# Patient Record
Sex: MALE | Race: WHITE | Employment: OTHER | URBAN - METROPOLITAN AREA
[De-identification: names, ages, dates, MRNs, and addresses within clinical notes are randomized per-mention and may not be internally consistent; named-entity substitution may affect disease eponyms.]

---

## 2017-04-21 ENCOUNTER — HOSPITAL ENCOUNTER (EMERGENCY)
Age: 82
Discharge: HOME | End: 2017-04-21
Attending: SPECIALIST
Payer: MEDICARE

## 2017-04-21 VITALS
BODY MASS INDEX: 31.01 KG/M2 | TEMPERATURE: 97.6 F | WEIGHT: 175 LBS | RESPIRATION RATE: 14 BRPM | SYSTOLIC BLOOD PRESSURE: 149 MMHG | HEIGHT: 63 IN | HEART RATE: 70 BPM | DIASTOLIC BLOOD PRESSURE: 85 MMHG | OXYGEN SATURATION: 96 %

## 2017-04-21 DIAGNOSIS — R30.0 DYSURIA: Primary | ICD-10-CM

## 2017-04-21 DIAGNOSIS — R42 DIZZINESS: ICD-10-CM

## 2017-04-21 LAB
-: ABNORMAL
AMORPHOUS: ABNORMAL
BACTERIA: ABNORMAL
BILIRUBIN URINE: ABNORMAL
CASTS UA: ABNORMAL /LPF
COLOR: YELLOW
COMMENT UA: ABNORMAL
CRYSTALS, UA: ABNORMAL /HPF
EPITHELIAL CELLS UA: ABNORMAL /HPF (ref 0–5)
GLUCOSE URINE: NEGATIVE
KETONES, URINE: NEGATIVE
LEUKOCYTE ESTERASE, URINE: NEGATIVE
MUCUS: ABNORMAL
NITRITE, URINE: NEGATIVE
OTHER OBSERVATIONS UA: ABNORMAL
PH UA: 6 (ref 5–8)
PROTEIN UA: ABNORMAL
RBC UA: ABNORMAL /HPF (ref 0–2)
RENAL EPITHELIAL, UA: ABNORMAL /HPF
SPECIFIC GRAVITY UA: 1.02 (ref 1–1.03)
TRICHOMONAS: ABNORMAL
TURBIDITY: ABNORMAL
URINE HGB: NEGATIVE
UROBILINOGEN, URINE: ABNORMAL
WBC UA: ABNORMAL /HPF (ref 0–5)
YEAST: ABNORMAL

## 2017-04-21 PROCEDURE — 87086 URINE CULTURE/COLONY COUNT: CPT

## 2017-04-21 PROCEDURE — 81001 URINALYSIS AUTO W/SCOPE: CPT

## 2017-04-21 PROCEDURE — 99284 EMERGENCY DEPT VISIT MOD MDM: CPT

## 2017-04-21 RX ORDER — SULFAMETHOXAZOLE AND TRIMETHOPRIM 800; 160 MG/1; MG/1
1 TABLET ORAL 2 TIMES DAILY
Qty: 14 TABLET | Refills: 0 | Status: SHIPPED | OUTPATIENT
Start: 2017-04-21 | End: 2017-04-28

## 2017-04-21 ASSESSMENT — ENCOUNTER SYMPTOMS
VOMITING: 0
ABDOMINAL PAIN: 0
NAUSEA: 0

## 2017-04-22 LAB
CULTURE: NO GROWTH
CULTURE: NORMAL
Lab: NORMAL
SPECIMEN DESCRIPTION: NORMAL
SPECIMEN DESCRIPTION: NORMAL
STATUS: NORMAL

## 2017-05-01 PROBLEM — Z95.0 PACEMAKER: Status: ACTIVE | Noted: 2017-05-01

## 2017-06-19 ENCOUNTER — HOSPITAL ENCOUNTER (EMERGENCY)
Age: 82
Discharge: HOME | End: 2017-06-19
Attending: EMERGENCY MEDICINE
Payer: MEDICARE

## 2017-06-19 VITALS
BODY MASS INDEX: 28.93 KG/M2 | HEIGHT: 66 IN | HEART RATE: 90 BPM | WEIGHT: 180 LBS | SYSTOLIC BLOOD PRESSURE: 144 MMHG | OXYGEN SATURATION: 98 % | RESPIRATION RATE: 18 BRPM | DIASTOLIC BLOOD PRESSURE: 74 MMHG | TEMPERATURE: 97.5 F

## 2017-06-19 VITALS
HEART RATE: 72 BPM | RESPIRATION RATE: 18 BRPM | DIASTOLIC BLOOD PRESSURE: 78 MMHG | OXYGEN SATURATION: 99 % | SYSTOLIC BLOOD PRESSURE: 156 MMHG

## 2017-06-19 DIAGNOSIS — K94.01 BLEEDING FROM COLOSTOMY (HCC): Primary | ICD-10-CM

## 2017-06-19 PROCEDURE — 6370000000 HC RX 637 (ALT 250 FOR IP): Performed by: EMERGENCY MEDICINE

## 2017-06-19 PROCEDURE — 99282 EMERGENCY DEPT VISIT SF MDM: CPT

## 2017-06-19 RX ADMIN — SILVER NITRATE APPLICATORS: 25; 75 STICK TOPICAL at 17:44

## 2017-06-19 RX ADMIN — SILVER NITRATE APPLICATORS: 25; 75 STICK TOPICAL at 09:26

## 2017-06-28 ENCOUNTER — CARE COORDINATION (OUTPATIENT)
Dept: CASE MANAGEMENT | Age: 82
End: 2017-06-28

## 2018-04-24 ENCOUNTER — HOSPITAL ENCOUNTER (OUTPATIENT)
Age: 83
Discharge: HOME | End: 2018-04-24
Payer: MEDICARE

## 2018-04-24 ENCOUNTER — HOSPITAL ENCOUNTER (OUTPATIENT)
Dept: CT IMAGING | Age: 83
Discharge: HOME | End: 2018-04-26
Payer: MEDICARE

## 2018-04-24 DIAGNOSIS — C44.42: ICD-10-CM

## 2018-04-24 LAB
BUN BLDV-MCNC: 23 MG/DL (ref 8–23)
CREAT SERPL-MCNC: 1.24 MG/DL (ref 0.7–1.2)
GFR AFRICAN AMERICAN: >60 ML/MIN
GFR NON-AFRICAN AMERICAN: 55 ML/MIN
GFR SERPL CREATININE-BSD FRML MDRD: ABNORMAL ML/MIN/{1.73_M2}
GFR SERPL CREATININE-BSD FRML MDRD: ABNORMAL ML/MIN/{1.73_M2}

## 2018-04-24 PROCEDURE — 36415 COLL VENOUS BLD VENIPUNCTURE: CPT

## 2018-04-24 PROCEDURE — 82565 ASSAY OF CREATININE: CPT

## 2018-04-24 PROCEDURE — 84520 ASSAY OF UREA NITROGEN: CPT

## 2018-04-24 PROCEDURE — 70470 CT HEAD/BRAIN W/O & W/DYE: CPT

## 2018-04-24 PROCEDURE — 6360000004 HC RX CONTRAST MEDICATION: Performed by: DERMATOLOGY

## 2018-04-24 PROCEDURE — 70491 CT SOFT TISSUE NECK W/DYE: CPT

## 2018-04-24 PROCEDURE — 2580000003 HC RX 258: Performed by: DERMATOLOGY

## 2018-04-24 RX ORDER — SODIUM CHLORIDE 0.9 % (FLUSH) 0.9 %
10 SYRINGE (ML) INJECTION PRN
Status: DISCONTINUED | OUTPATIENT
Start: 2018-04-24 | End: 2018-04-27 | Stop reason: HOSPADM

## 2018-04-24 RX ORDER — 0.9 % SODIUM CHLORIDE 0.9 %
80 INTRAVENOUS SOLUTION INTRAVENOUS ONCE
Status: COMPLETED | OUTPATIENT
Start: 2018-04-24 | End: 2018-04-24

## 2018-04-24 RX ADMIN — SODIUM CHLORIDE 80 ML: 9 INJECTION, SOLUTION INTRAVENOUS at 13:09

## 2018-04-24 RX ADMIN — Medication 10 ML: at 13:09

## 2018-04-24 RX ADMIN — IOPAMIDOL 75 ML: 755 INJECTION, SOLUTION INTRAVENOUS at 13:08

## 2018-06-04 PROBLEM — I27.20 PULMONARY HTN (HCC): Status: ACTIVE | Noted: 2018-06-04

## 2018-06-04 PROBLEM — I50.42 CHRONIC COMBINED SYSTOLIC AND DIASTOLIC CONGESTIVE HEART FAILURE (HCC): Status: ACTIVE | Noted: 2018-06-04

## 2018-06-04 PROBLEM — I83.893 VARICOSE VEINS OF LEG WITH EDEMA, BILATERAL: Status: ACTIVE | Noted: 2018-06-04

## 2018-06-04 PROBLEM — I38 VALVULAR HEART DISEASE: Status: ACTIVE | Noted: 2018-06-04

## 2018-09-24 PROBLEM — I65.23 BILATERAL CAROTID ARTERY STENOSIS: Status: ACTIVE | Noted: 2018-09-24

## 2018-09-26 ENCOUNTER — HOSPITAL ENCOUNTER (EMERGENCY)
Age: 83
Discharge: HOME | End: 2018-09-26
Attending: EMERGENCY MEDICINE
Payer: MEDICARE

## 2018-09-26 ENCOUNTER — APPOINTMENT (OUTPATIENT)
Dept: CT IMAGING | Age: 83
End: 2018-09-26
Payer: MEDICARE

## 2018-09-26 VITALS
RESPIRATION RATE: 17 BRPM | SYSTOLIC BLOOD PRESSURE: 140 MMHG | OXYGEN SATURATION: 95 % | WEIGHT: 180 LBS | BODY MASS INDEX: 32.92 KG/M2 | HEART RATE: 79 BPM | DIASTOLIC BLOOD PRESSURE: 69 MMHG

## 2018-09-26 DIAGNOSIS — K92.2 GASTROINTESTINAL HEMORRHAGE, UNSPECIFIED GASTROINTESTINAL HEMORRHAGE TYPE: Primary | ICD-10-CM

## 2018-09-26 LAB
ABSOLUTE EOS #: 0.1 K/UL (ref 0–0.4)
ABSOLUTE IMMATURE GRANULOCYTE: ABNORMAL K/UL (ref 0–0.3)
ABSOLUTE LYMPH #: 0.7 K/UL (ref 1–4.8)
ABSOLUTE MONO #: 0.5 K/UL (ref 0.1–1.2)
ALBUMIN SERPL-MCNC: 3.6 G/DL (ref 3.5–5.2)
ALBUMIN/GLOBULIN RATIO: 1.4 (ref 1–2.5)
ALP BLD-CCNC: 96 U/L (ref 40–129)
ALT SERPL-CCNC: 13 U/L (ref 5–41)
AMYLASE: 50 U/L (ref 28–100)
ANION GAP SERPL CALCULATED.3IONS-SCNC: 14 MMOL/L (ref 9–17)
AST SERPL-CCNC: 21 U/L
BASOPHILS # BLD: 0 % (ref 0–2)
BASOPHILS ABSOLUTE: 0 K/UL (ref 0–0.2)
BILIRUB SERPL-MCNC: 0.91 MG/DL (ref 0.3–1.2)
BILIRUBIN DIRECT: 0.3 MG/DL
BILIRUBIN, INDIRECT: 0.61 MG/DL (ref 0–1)
BUN BLDV-MCNC: 27 MG/DL (ref 8–23)
BUN/CREAT BLD: ABNORMAL (ref 9–20)
CALCIUM SERPL-MCNC: 8.8 MG/DL (ref 8.6–10.4)
CHLORIDE BLD-SCNC: 106 MMOL/L (ref 98–107)
CO2: 20 MMOL/L (ref 20–31)
CREAT SERPL-MCNC: 1.19 MG/DL (ref 0.7–1.2)
DIFFERENTIAL TYPE: ABNORMAL
EOSINOPHILS RELATIVE PERCENT: 2 % (ref 1–4)
GFR AFRICAN AMERICAN: >60 ML/MIN
GFR NON-AFRICAN AMERICAN: 57 ML/MIN
GFR SERPL CREATININE-BSD FRML MDRD: ABNORMAL ML/MIN/{1.73_M2}
GFR SERPL CREATININE-BSD FRML MDRD: ABNORMAL ML/MIN/{1.73_M2}
GLOBULIN: ABNORMAL G/DL (ref 1.5–3.8)
GLUCOSE BLD-MCNC: 152 MG/DL (ref 70–99)
HCT VFR BLD CALC: 37.7 % (ref 41–53)
HEMOGLOBIN: 12.9 G/DL (ref 13.5–17.5)
IMMATURE GRANULOCYTES: ABNORMAL %
INR BLD: 1
LIPASE: 20 U/L (ref 13–60)
LYMPHOCYTES # BLD: 14 % (ref 24–44)
MCH RBC QN AUTO: 32.5 PG (ref 26–34)
MCHC RBC AUTO-ENTMCNC: 34.3 G/DL (ref 31–37)
MCV RBC AUTO: 94.9 FL (ref 80–100)
MONOCYTES # BLD: 10 % (ref 2–11)
NRBC AUTOMATED: ABNORMAL PER 100 WBC
PARTIAL THROMBOPLASTIN TIME: 25.4 SEC (ref 21.3–31.3)
PDW BLD-RTO: 14.1 % (ref 12.5–15.4)
PLATELET # BLD: 208 K/UL (ref 140–450)
PLATELET ESTIMATE: ABNORMAL
PMV BLD AUTO: 8.4 FL (ref 6–12)
POTASSIUM SERPL-SCNC: 4.5 MMOL/L (ref 3.7–5.3)
PROTHROMBIN TIME: 10.6 SEC (ref 9.4–12.6)
RBC # BLD: 3.97 M/UL (ref 4.5–5.9)
RBC # BLD: ABNORMAL 10*6/UL
SEG NEUTROPHILS: 74 % (ref 36–66)
SEGMENTED NEUTROPHILS ABSOLUTE COUNT: 3.9 K/UL (ref 1.8–7.7)
SODIUM BLD-SCNC: 140 MMOL/L (ref 135–144)
TOTAL PROTEIN: 6.2 G/DL (ref 6.4–8.3)
WBC # BLD: 5.3 K/UL (ref 3.5–11)
WBC # BLD: ABNORMAL 10*3/UL

## 2018-09-26 PROCEDURE — 6360000004 HC RX CONTRAST MEDICATION: Performed by: EMERGENCY MEDICINE

## 2018-09-26 PROCEDURE — 36415 COLL VENOUS BLD VENIPUNCTURE: CPT

## 2018-09-26 PROCEDURE — 80076 HEPATIC FUNCTION PANEL: CPT

## 2018-09-26 PROCEDURE — 85610 PROTHROMBIN TIME: CPT

## 2018-09-26 PROCEDURE — 85730 THROMBOPLASTIN TIME PARTIAL: CPT

## 2018-09-26 PROCEDURE — 74177 CT ABD & PELVIS W/CONTRAST: CPT

## 2018-09-26 PROCEDURE — 96361 HYDRATE IV INFUSION ADD-ON: CPT

## 2018-09-26 PROCEDURE — 85025 COMPLETE CBC W/AUTO DIFF WBC: CPT

## 2018-09-26 PROCEDURE — 99283 EMERGENCY DEPT VISIT LOW MDM: CPT

## 2018-09-26 PROCEDURE — 2580000003 HC RX 258: Performed by: PHYSICIAN ASSISTANT

## 2018-09-26 PROCEDURE — 82150 ASSAY OF AMYLASE: CPT

## 2018-09-26 PROCEDURE — 83690 ASSAY OF LIPASE: CPT

## 2018-09-26 PROCEDURE — 80048 BASIC METABOLIC PNL TOTAL CA: CPT

## 2018-09-26 PROCEDURE — 96360 HYDRATION IV INFUSION INIT: CPT

## 2018-09-26 PROCEDURE — 2580000003 HC RX 258: Performed by: EMERGENCY MEDICINE

## 2018-09-26 RX ORDER — SODIUM CHLORIDE 0.9 % (FLUSH) 0.9 %
10 SYRINGE (ML) INJECTION PRN
Status: DISCONTINUED | OUTPATIENT
Start: 2018-09-26 | End: 2018-09-26 | Stop reason: HOSPADM

## 2018-09-26 RX ORDER — 0.9 % SODIUM CHLORIDE 0.9 %
60 INTRAVENOUS SOLUTION INTRAVENOUS ONCE
Status: COMPLETED | OUTPATIENT
Start: 2018-09-26 | End: 2018-09-26

## 2018-09-26 RX ORDER — 0.9 % SODIUM CHLORIDE 0.9 %
500 INTRAVENOUS SOLUTION INTRAVENOUS ONCE
Status: COMPLETED | OUTPATIENT
Start: 2018-09-26 | End: 2018-09-26

## 2018-09-26 RX ADMIN — Medication 10 ML: at 15:52

## 2018-09-26 RX ADMIN — IOPAMIDOL 75 ML: 755 INJECTION, SOLUTION INTRAVENOUS at 15:45

## 2018-09-26 RX ADMIN — SODIUM CHLORIDE 60 ML: 9 INJECTION, SOLUTION INTRAVENOUS at 15:44

## 2018-09-26 RX ADMIN — SODIUM CHLORIDE 500 ML: 9 INJECTION, SOLUTION INTRAVENOUS at 15:03

## 2018-09-26 ASSESSMENT — ENCOUNTER SYMPTOMS
EYE DISCHARGE: 0
EYE REDNESS: 0
VOMITING: 0
SHORTNESS OF BREATH: 0
ABDOMINAL PAIN: 0
SORE THROAT: 0
COUGH: 0
NAUSEA: 0

## 2018-09-27 PROBLEM — I25.10 CORONARY ATHEROSCLEROSIS: Status: ACTIVE | Noted: 2018-09-27

## 2018-09-27 PROBLEM — C20 MALIGNANT TUMOR OF RECTUM (HCC): Status: ACTIVE | Noted: 2018-09-27

## 2018-09-27 PROBLEM — R40.0 DAYTIME SOMNOLENCE: Status: ACTIVE | Noted: 2018-09-27

## 2018-09-27 PROBLEM — E03.9 HYPOTHYROIDISM: Status: ACTIVE | Noted: 2018-09-27

## 2018-09-27 PROBLEM — G47.33 OBSTRUCTIVE SLEEP APNEA SYNDROME: Status: ACTIVE | Noted: 2018-09-27

## 2018-09-27 PROBLEM — K21.9 GASTROESOPHAGEAL REFLUX DISEASE WITHOUT ESOPHAGITIS: Status: ACTIVE | Noted: 2018-09-27

## 2018-09-27 PROBLEM — M16.10 PRIMARY LOCALIZED OSTEOARTHRITIS OF PELVIC REGION AND THIGH: Status: ACTIVE | Noted: 2018-09-27

## 2018-09-27 PROBLEM — I50.9 CONGESTIVE HEART FAILURE (HCC): Status: ACTIVE | Noted: 2018-09-27

## 2018-09-27 PROBLEM — E03.9 ACQUIRED HYPOTHYROIDISM: Status: ACTIVE | Noted: 2018-09-27

## 2018-09-27 PROBLEM — R53.83 FATIGUE: Status: ACTIVE | Noted: 2018-09-27

## 2018-09-27 PROBLEM — F32.A DEPRESSIVE DISORDER: Status: ACTIVE | Noted: 2018-09-27

## 2018-09-27 PROBLEM — Z99.89 DEPENDENCE ON ENABLING MACHINE: Status: ACTIVE | Noted: 2018-09-27

## 2019-01-01 ENCOUNTER — CARE COORDINATION (OUTPATIENT)
Dept: OTHER | Facility: CLINIC | Age: 84
End: 2019-01-01

## 2019-01-01 ENCOUNTER — APPOINTMENT (OUTPATIENT)
Dept: ULTRASOUND IMAGING | Age: 84
DRG: 690 | End: 2019-01-01
Payer: MEDICARE

## 2019-01-01 ENCOUNTER — APPOINTMENT (OUTPATIENT)
Dept: GENERAL RADIOLOGY | Age: 84
DRG: 690 | End: 2019-01-01
Payer: MEDICARE

## 2019-01-01 ENCOUNTER — CARE COORDINATION (OUTPATIENT)
Dept: CASE MANAGEMENT | Age: 84
End: 2019-01-01

## 2019-01-01 ENCOUNTER — HOSPITAL ENCOUNTER (EMERGENCY)
Age: 84
Discharge: HOME | End: 2019-10-12
Attending: EMERGENCY MEDICINE
Payer: MEDICARE

## 2019-01-01 ENCOUNTER — HOSPITAL ENCOUNTER (OUTPATIENT)
Age: 84
Discharge: HOME | End: 2019-08-15
Payer: MEDICARE

## 2019-01-01 ENCOUNTER — HOSPITAL ENCOUNTER (EMERGENCY)
Age: 84
Discharge: HOME | End: 2019-11-17
Attending: EMERGENCY MEDICINE
Payer: MEDICARE

## 2019-01-01 ENCOUNTER — APPOINTMENT (OUTPATIENT)
Dept: CT IMAGING | Age: 84
DRG: 690 | End: 2019-01-01
Payer: MEDICARE

## 2019-01-01 ENCOUNTER — HOSPITAL ENCOUNTER (INPATIENT)
Age: 84
LOS: 3 days | Discharge: HOME HEALTH | DRG: 291 | End: 2019-09-28
Attending: EMERGENCY MEDICINE | Admitting: INTERNAL MEDICINE
Payer: MEDICARE

## 2019-01-01 ENCOUNTER — HOSPITAL ENCOUNTER (INPATIENT)
Age: 84
LOS: 4 days | Discharge: NURSING, REHAB OR LONG-TERM CARE | DRG: 690 | End: 2019-08-27
Attending: EMERGENCY MEDICINE | Admitting: INTERNAL MEDICINE
Payer: MEDICARE

## 2019-01-01 ENCOUNTER — APPOINTMENT (OUTPATIENT)
Dept: GENERAL RADIOLOGY | Age: 84
DRG: 291 | End: 2019-01-01
Payer: MEDICARE

## 2019-01-01 VITALS
BODY MASS INDEX: 29.43 KG/M2 | DIASTOLIC BLOOD PRESSURE: 67 MMHG | WEIGHT: 172.4 LBS | HEIGHT: 64 IN | HEART RATE: 71 BPM | TEMPERATURE: 97.3 F | RESPIRATION RATE: 18 BRPM | OXYGEN SATURATION: 96 % | SYSTOLIC BLOOD PRESSURE: 116 MMHG

## 2019-01-01 VITALS
WEIGHT: 177.25 LBS | OXYGEN SATURATION: 97 % | DIASTOLIC BLOOD PRESSURE: 52 MMHG | HEART RATE: 73 BPM | BODY MASS INDEX: 32.62 KG/M2 | RESPIRATION RATE: 20 BRPM | TEMPERATURE: 98.5 F | SYSTOLIC BLOOD PRESSURE: 120 MMHG | HEIGHT: 62 IN

## 2019-01-01 VITALS
BODY MASS INDEX: 33.13 KG/M2 | DIASTOLIC BLOOD PRESSURE: 80 MMHG | RESPIRATION RATE: 20 BRPM | SYSTOLIC BLOOD PRESSURE: 126 MMHG | HEART RATE: 73 BPM | TEMPERATURE: 97.5 F | WEIGHT: 180 LBS | HEIGHT: 62 IN | OXYGEN SATURATION: 95 %

## 2019-01-01 VITALS
WEIGHT: 170 LBS | HEART RATE: 72 BPM | OXYGEN SATURATION: 99 % | RESPIRATION RATE: 16 BRPM | SYSTOLIC BLOOD PRESSURE: 109 MMHG | TEMPERATURE: 97.9 F | BODY MASS INDEX: 31.28 KG/M2 | HEIGHT: 62 IN | DIASTOLIC BLOOD PRESSURE: 87 MMHG

## 2019-01-01 DIAGNOSIS — N39.0 URINARY TRACT INFECTION WITHOUT HEMATURIA, SITE UNSPECIFIED: Primary | ICD-10-CM

## 2019-01-01 DIAGNOSIS — A49.9 BACTERIAL UTI: ICD-10-CM

## 2019-01-01 DIAGNOSIS — R97.20 ELEVATED PSA: ICD-10-CM

## 2019-01-01 DIAGNOSIS — R77.8 ELEVATED TROPONIN: ICD-10-CM

## 2019-01-01 DIAGNOSIS — N39.0 BACTERIAL UTI: ICD-10-CM

## 2019-01-01 DIAGNOSIS — N28.9 RENAL INSUFFICIENCY: ICD-10-CM

## 2019-01-01 DIAGNOSIS — R35.0 URINARY FREQUENCY: ICD-10-CM

## 2019-01-01 DIAGNOSIS — I50.9 CONGESTIVE HEART FAILURE, UNSPECIFIED HF CHRONICITY, UNSPECIFIED HEART FAILURE TYPE (HCC): ICD-10-CM

## 2019-01-01 DIAGNOSIS — N17.9 AKI (ACUTE KIDNEY INJURY) (HCC): ICD-10-CM

## 2019-01-01 DIAGNOSIS — I50.42 CHRONIC COMBINED SYSTOLIC AND DIASTOLIC CONGESTIVE HEART FAILURE (HCC): ICD-10-CM

## 2019-01-01 DIAGNOSIS — S51.019A SKIN TEAR OF ELBOW WITHOUT COMPLICATION, INITIAL ENCOUNTER: Primary | ICD-10-CM

## 2019-01-01 DIAGNOSIS — J18.9 PNEUMONIA DUE TO ORGANISM: Primary | ICD-10-CM

## 2019-01-01 DIAGNOSIS — K94.01 BLEEDING FROM COLOSTOMY (HCC): Primary | ICD-10-CM

## 2019-01-01 LAB
-: ABNORMAL
-: ABNORMAL
ABSOLUTE EOS #: 0 K/UL (ref 0–0.4)
ABSOLUTE EOS #: 0 K/UL (ref 0–0.4)
ABSOLUTE EOS #: 0.1 K/UL (ref 0–0.4)
ABSOLUTE EOS #: 0.1 K/UL (ref 0–0.4)
ABSOLUTE EOS #: 0.2 K/UL (ref 0–0.4)
ABSOLUTE IMMATURE GRANULOCYTE: ABNORMAL K/UL (ref 0–0.3)
ABSOLUTE LYMPH #: 0.4 K/UL (ref 1–4.8)
ABSOLUTE LYMPH #: 0.45 K/UL (ref 1–4.8)
ABSOLUTE LYMPH #: 0.7 K/UL (ref 1–4.8)
ABSOLUTE LYMPH #: 0.9 K/UL (ref 1–4.8)
ABSOLUTE LYMPH #: 1 K/UL (ref 1–4.8)
ABSOLUTE MONO #: 0.3 K/UL (ref 0.1–1.2)
ABSOLUTE MONO #: 0.6 K/UL (ref 0.1–1.2)
ABSOLUTE MONO #: 0.7 K/UL (ref 0.1–1.2)
ABSOLUTE MONO #: 0.8 K/UL (ref 0.1–1.2)
ABSOLUTE MONO #: 1.05 K/UL (ref 0.1–0.8)
ALBUMIN SERPL-MCNC: 3.3 G/DL (ref 3.5–5.2)
ALBUMIN/GLOBULIN RATIO: 1.2 (ref 1–2.5)
ALP BLD-CCNC: 69 U/L (ref 40–129)
ALT SERPL-CCNC: 9 U/L (ref 5–41)
AMORPHOUS: ABNORMAL
AMORPHOUS: ABNORMAL
ANION GAP SERPL CALCULATED.3IONS-SCNC: 10 MMOL/L (ref 9–17)
ANION GAP SERPL CALCULATED.3IONS-SCNC: 11 MMOL/L (ref 9–17)
ANION GAP SERPL CALCULATED.3IONS-SCNC: 12 MMOL/L (ref 9–17)
ANION GAP SERPL CALCULATED.3IONS-SCNC: 13 MMOL/L (ref 9–17)
ANION GAP SERPL CALCULATED.3IONS-SCNC: 13 MMOL/L (ref 9–17)
ANION GAP SERPL CALCULATED.3IONS-SCNC: 14 MMOL/L (ref 9–17)
ANION GAP SERPL CALCULATED.3IONS-SCNC: 16 MMOL/L (ref 9–17)
ANION GAP SERPL CALCULATED.3IONS-SCNC: 17 MMOL/L (ref 9–17)
AST SERPL-CCNC: 19 U/L
BACTERIA: ABNORMAL
BACTERIA: ABNORMAL
BASOPHILS # BLD: 0 % (ref 0–2)
BASOPHILS ABSOLUTE: 0 K/UL (ref 0–0.2)
BILIRUB SERPL-MCNC: 1.27 MG/DL (ref 0.3–1.2)
BILIRUBIN URINE: NEGATIVE
BNP INTERPRETATION: ABNORMAL
BUN BLDV-MCNC: 30 MG/DL (ref 8–23)
BUN BLDV-MCNC: 30 MG/DL (ref 8–23)
BUN BLDV-MCNC: 31 MG/DL (ref 8–23)
BUN BLDV-MCNC: 32 MG/DL (ref 8–23)
BUN BLDV-MCNC: 34 MG/DL (ref 8–23)
BUN BLDV-MCNC: 35 MG/DL (ref 8–23)
BUN BLDV-MCNC: 37 MG/DL (ref 8–23)
BUN BLDV-MCNC: 40 MG/DL (ref 8–23)
BUN BLDV-MCNC: 43 MG/DL (ref 8–23)
BUN BLDV-MCNC: 45 MG/DL (ref 8–23)
BUN/CREAT BLD: ABNORMAL (ref 9–20)
CALCIUM SERPL-MCNC: 8.5 MG/DL (ref 8.6–10.4)
CALCIUM SERPL-MCNC: 8.6 MG/DL (ref 8.6–10.4)
CALCIUM SERPL-MCNC: 8.6 MG/DL (ref 8.6–10.4)
CALCIUM SERPL-MCNC: 8.7 MG/DL (ref 8.6–10.4)
CALCIUM SERPL-MCNC: 8.8 MG/DL (ref 8.6–10.4)
CALCIUM SERPL-MCNC: 8.8 MG/DL (ref 8.6–10.4)
CALCIUM SERPL-MCNC: 9 MG/DL (ref 8.6–10.4)
CALCIUM SERPL-MCNC: 9.1 MG/DL (ref 8.6–10.4)
CALCIUM SERPL-MCNC: 9.3 MG/DL (ref 8.6–10.4)
CALCIUM SERPL-MCNC: 9.3 MG/DL (ref 8.6–10.4)
CASTS UA: ABNORMAL /LPF
CASTS UA: ABNORMAL /LPF
CHLORIDE BLD-SCNC: 101 MMOL/L (ref 98–107)
CHLORIDE BLD-SCNC: 102 MMOL/L (ref 98–107)
CHLORIDE BLD-SCNC: 103 MMOL/L (ref 98–107)
CHLORIDE BLD-SCNC: 103 MMOL/L (ref 98–107)
CHLORIDE BLD-SCNC: 104 MMOL/L (ref 98–107)
CHLORIDE BLD-SCNC: 98 MMOL/L (ref 98–107)
CHLORIDE BLD-SCNC: 98 MMOL/L (ref 98–107)
CHLORIDE BLD-SCNC: 99 MMOL/L (ref 98–107)
CO2: 20 MMOL/L (ref 20–31)
CO2: 21 MMOL/L (ref 20–31)
CO2: 22 MMOL/L (ref 20–31)
CO2: 23 MMOL/L (ref 20–31)
CO2: 23 MMOL/L (ref 20–31)
CO2: 24 MMOL/L (ref 20–31)
CO2: 25 MMOL/L (ref 20–31)
COLOR: YELLOW
COMMENT UA: ABNORMAL
COMMENT UA: ABNORMAL
COMMENT UA: NORMAL
CREAT SERPL-MCNC: 1.26 MG/DL (ref 0.7–1.2)
CREAT SERPL-MCNC: 1.35 MG/DL (ref 0.7–1.2)
CREAT SERPL-MCNC: 1.38 MG/DL (ref 0.7–1.2)
CREAT SERPL-MCNC: 1.42 MG/DL (ref 0.7–1.2)
CREAT SERPL-MCNC: 1.42 MG/DL (ref 0.7–1.2)
CREAT SERPL-MCNC: 1.43 MG/DL (ref 0.7–1.2)
CREAT SERPL-MCNC: 1.49 MG/DL (ref 0.7–1.2)
CREAT SERPL-MCNC: 1.57 MG/DL (ref 0.7–1.2)
CREAT SERPL-MCNC: 1.63 MG/DL (ref 0.7–1.2)
CREAT SERPL-MCNC: 1.64 MG/DL (ref 0.7–1.2)
CRYSTALS, UA: ABNORMAL /HPF
CRYSTALS, UA: ABNORMAL /HPF
CULTURE: ABNORMAL
CULTURE: NO GROWTH
CULTURE: NO GROWTH
CULTURE: NORMAL
DIFFERENTIAL TYPE: ABNORMAL
EKG ATRIAL RATE: 85 BPM
EKG Q-T INTERVAL: 434 MS
EKG QRS DURATION: 142 MS
EKG QTC CALCULATION (BAZETT): 481 MS
EKG R AXIS: -82 DEGREES
EKG T AXIS: 21 DEGREES
EKG VENTRICULAR RATE: 74 BPM
EOSINOPHILS RELATIVE PERCENT: 0 % (ref 1–4)
EOSINOPHILS RELATIVE PERCENT: 0 % (ref 1–4)
EOSINOPHILS RELATIVE PERCENT: 1 % (ref 1–4)
EOSINOPHILS RELATIVE PERCENT: 1 % (ref 1–4)
EOSINOPHILS RELATIVE PERCENT: 2 % (ref 1–4)
EPITHELIAL CELLS UA: ABNORMAL /HPF
EPITHELIAL CELLS UA: ABNORMAL /HPF (ref 0–5)
GFR AFRICAN AMERICAN: 48 ML/MIN
GFR AFRICAN AMERICAN: 48 ML/MIN
GFR AFRICAN AMERICAN: 50 ML/MIN
GFR AFRICAN AMERICAN: 53 ML/MIN
GFR AFRICAN AMERICAN: 56 ML/MIN
GFR AFRICAN AMERICAN: 57 ML/MIN
GFR AFRICAN AMERICAN: 57 ML/MIN
GFR AFRICAN AMERICAN: 58 ML/MIN
GFR AFRICAN AMERICAN: 60 ML/MIN
GFR AFRICAN AMERICAN: >60 ML/MIN
GFR NON-AFRICAN AMERICAN: 39 ML/MIN
GFR NON-AFRICAN AMERICAN: 40 ML/MIN
GFR NON-AFRICAN AMERICAN: 42 ML/MIN
GFR NON-AFRICAN AMERICAN: 44 ML/MIN
GFR NON-AFRICAN AMERICAN: 46 ML/MIN
GFR NON-AFRICAN AMERICAN: 47 ML/MIN
GFR NON-AFRICAN AMERICAN: 47 ML/MIN
GFR NON-AFRICAN AMERICAN: 48 ML/MIN
GFR NON-AFRICAN AMERICAN: 49 ML/MIN
GFR NON-AFRICAN AMERICAN: 54 ML/MIN
GFR SERPL CREATININE-BSD FRML MDRD: ABNORMAL ML/MIN/{1.73_M2}
GLUCOSE BLD-MCNC: 103 MG/DL (ref 70–99)
GLUCOSE BLD-MCNC: 108 MG/DL (ref 70–99)
GLUCOSE BLD-MCNC: 110 MG/DL (ref 70–99)
GLUCOSE BLD-MCNC: 125 MG/DL (ref 70–99)
GLUCOSE BLD-MCNC: 188 MG/DL (ref 70–99)
GLUCOSE BLD-MCNC: 84 MG/DL (ref 70–99)
GLUCOSE BLD-MCNC: 90 MG/DL (ref 70–99)
GLUCOSE BLD-MCNC: 98 MG/DL (ref 70–99)
GLUCOSE BLD-MCNC: 98 MG/DL (ref 70–99)
GLUCOSE BLD-MCNC: 99 MG/DL (ref 70–99)
GLUCOSE URINE: NEGATIVE
HCT VFR BLD CALC: 36.5 % (ref 41–53)
HCT VFR BLD CALC: 37 % (ref 41–53)
HCT VFR BLD CALC: 37.9 % (ref 41–53)
HCT VFR BLD CALC: 38 % (ref 41–53)
HCT VFR BLD CALC: 38.7 % (ref 41–53)
HCT VFR BLD CALC: 39.4 % (ref 41–53)
HCT VFR BLD CALC: 40.5 % (ref 41–53)
HCT VFR BLD CALC: 43.2 % (ref 41–53)
HEMOGLOBIN: 12.4 G/DL (ref 13.5–17.5)
HEMOGLOBIN: 12.7 G/DL (ref 13.5–17.5)
HEMOGLOBIN: 12.7 G/DL (ref 13.5–17.5)
HEMOGLOBIN: 13 G/DL (ref 13.5–17.5)
HEMOGLOBIN: 13.2 G/DL (ref 13.5–17.5)
HEMOGLOBIN: 13.3 G/DL (ref 13.5–17.5)
HEMOGLOBIN: 14.3 G/DL (ref 13.5–17.5)
HEMOGLOBIN: 15 G/DL (ref 13.5–17.5)
IMMATURE GRANULOCYTES: ABNORMAL %
INR BLD: 1.1
KETONES, URINE: NEGATIVE
LACTIC ACID, SEPSIS WHOLE BLOOD: ABNORMAL MMOL/L (ref 0.5–1.9)
LACTIC ACID, SEPSIS: 2.4 MMOL/L (ref 0.5–1.9)
LACTIC ACID, WHOLE BLOOD: NORMAL MMOL/L (ref 0.7–2.1)
LACTIC ACID: 1.6 MMOL/L (ref 0.5–2.2)
LACTIC ACID: 1.6 MMOL/L (ref 0.5–2.2)
LACTIC ACID: 1.8 MMOL/L (ref 0.5–2.2)
LEUKOCYTE ESTERASE, URINE: ABNORMAL
LEUKOCYTE ESTERASE, URINE: ABNORMAL
LEUKOCYTE ESTERASE, URINE: NEGATIVE
LV EF: 53 %
LVEF MODALITY: NORMAL
LYMPHOCYTES # BLD: 10 % (ref 24–44)
LYMPHOCYTES # BLD: 11 % (ref 24–44)
LYMPHOCYTES # BLD: 3 % (ref 24–44)
LYMPHOCYTES # BLD: 5 % (ref 24–44)
LYMPHOCYTES # BLD: 8 % (ref 24–44)
Lab: ABNORMAL
Lab: NORMAL
MAGNESIUM: 1.8 MG/DL (ref 1.6–2.6)
MCH RBC QN AUTO: 32.3 PG (ref 26–34)
MCH RBC QN AUTO: 32.5 PG (ref 26–34)
MCH RBC QN AUTO: 32.8 PG (ref 26–34)
MCH RBC QN AUTO: 33.3 PG (ref 26–34)
MCH RBC QN AUTO: 33.5 PG (ref 26–34)
MCH RBC QN AUTO: 33.5 PG (ref 26–34)
MCHC RBC AUTO-ENTMCNC: 33.4 G/DL (ref 31–37)
MCHC RBC AUTO-ENTMCNC: 33.5 G/DL (ref 31–37)
MCHC RBC AUTO-ENTMCNC: 33.6 G/DL (ref 31–37)
MCHC RBC AUTO-ENTMCNC: 33.8 G/DL (ref 31–37)
MCHC RBC AUTO-ENTMCNC: 34.6 G/DL (ref 31–37)
MCHC RBC AUTO-ENTMCNC: 34.8 G/DL (ref 31–37)
MCHC RBC AUTO-ENTMCNC: 34.9 G/DL (ref 31–37)
MCHC RBC AUTO-ENTMCNC: 35.2 G/DL (ref 31–37)
MCV RBC AUTO: 100.1 FL (ref 80–100)
MCV RBC AUTO: 94.8 FL (ref 80–100)
MCV RBC AUTO: 94.8 FL (ref 80–100)
MCV RBC AUTO: 95.4 FL (ref 80–100)
MCV RBC AUTO: 96 FL (ref 80–100)
MCV RBC AUTO: 96.1 FL (ref 80–100)
MCV RBC AUTO: 96.2 FL (ref 80–100)
MCV RBC AUTO: 99.5 FL (ref 80–100)
MONOCYTES # BLD: 10 % (ref 2–11)
MONOCYTES # BLD: 4 % (ref 2–11)
MONOCYTES # BLD: 7 % (ref 1–7)
MONOCYTES # BLD: 8 % (ref 2–11)
MONOCYTES # BLD: 8 % (ref 2–11)
MORPHOLOGY: NORMAL
MUCUS: ABNORMAL
MUCUS: ABNORMAL
NITRITE, URINE: NEGATIVE
NRBC AUTOMATED: ABNORMAL PER 100 WBC
OTHER OBSERVATIONS UA: ABNORMAL
OTHER OBSERVATIONS UA: ABNORMAL
PDW BLD-RTO: 13.6 % (ref 12.5–15.4)
PDW BLD-RTO: 13.7 % (ref 12.5–15.4)
PDW BLD-RTO: 13.8 % (ref 12.5–15.4)
PDW BLD-RTO: 13.8 % (ref 12.5–15.4)
PDW BLD-RTO: 13.9 % (ref 12.5–15.4)
PDW BLD-RTO: 14.5 % (ref 12.5–15.4)
PDW BLD-RTO: 14.7 % (ref 12.5–15.4)
PDW BLD-RTO: 16.4 % (ref 12.5–15.4)
PH UA: 5 (ref 5–8)
PH UA: 5 (ref 5–8)
PH UA: 5.5
PLATELET # BLD: 138 K/UL (ref 140–450)
PLATELET # BLD: 147 K/UL (ref 140–450)
PLATELET # BLD: 154 K/UL (ref 140–450)
PLATELET # BLD: 158 K/UL (ref 140–450)
PLATELET # BLD: 159 K/UL (ref 140–450)
PLATELET # BLD: 162 K/UL (ref 140–450)
PLATELET # BLD: 168 K/UL (ref 140–450)
PLATELET # BLD: 180 K/UL (ref 140–450)
PLATELET ESTIMATE: ABNORMAL
PMV BLD AUTO: 8 FL (ref 6–12)
PMV BLD AUTO: 8.2 FL (ref 6–12)
PMV BLD AUTO: 8.3 FL (ref 6–12)
PMV BLD AUTO: 8.4 FL (ref 6–12)
PMV BLD AUTO: 8.5 FL (ref 6–12)
POTASSIUM SERPL-SCNC: 3.9 MMOL/L (ref 3.7–5.3)
POTASSIUM SERPL-SCNC: 4 MMOL/L (ref 3.7–5.3)
POTASSIUM SERPL-SCNC: 4.1 MMOL/L (ref 3.7–5.3)
POTASSIUM SERPL-SCNC: 4.1 MMOL/L (ref 3.7–5.3)
POTASSIUM SERPL-SCNC: 4.3 MMOL/L (ref 3.7–5.3)
POTASSIUM SERPL-SCNC: 4.5 MMOL/L (ref 3.7–5.3)
POTASSIUM SERPL-SCNC: 4.9 MMOL/L (ref 3.7–5.3)
PRO-BNP: 5640 PG/ML
PRO-BNP: 5656 PG/ML
PRO-BNP: 7444 PG/ML
PRO-BNP: 8166 PG/ML
PROSTATE SPECIFIC ANTIGEN: 13.63 UG/L
PROTEIN UA: NEGATIVE
PROTHROMBIN TIME: 11.6 SEC (ref 9.4–12.6)
RBC # BLD: 3.79 M/UL (ref 4.5–5.9)
RBC # BLD: 3.79 M/UL (ref 4.5–5.9)
RBC # BLD: 3.85 M/UL (ref 4.5–5.9)
RBC # BLD: 3.89 M/UL (ref 4.5–5.9)
RBC # BLD: 3.97 M/UL (ref 4.5–5.9)
RBC # BLD: 4.1 M/UL (ref 4.5–5.9)
RBC # BLD: 4.28 M/UL (ref 4.5–5.9)
RBC # BLD: 4.56 M/UL (ref 4.5–5.9)
RBC # BLD: ABNORMAL 10*6/UL
RBC UA: ABNORMAL /HPF
RBC UA: ABNORMAL /HPF (ref 0–2)
RENAL EPITHELIAL, UA: ABNORMAL /HPF
RENAL EPITHELIAL, UA: ABNORMAL /HPF
SEG NEUTROPHILS: 78 % (ref 36–66)
SEG NEUTROPHILS: 80 % (ref 36–66)
SEG NEUTROPHILS: 83 % (ref 36–66)
SEG NEUTROPHILS: 90 % (ref 36–66)
SEG NEUTROPHILS: 91 % (ref 36–66)
SEGMENTED NEUTROPHILS ABSOLUTE COUNT: 13.5 K/UL (ref 1.8–7.7)
SEGMENTED NEUTROPHILS ABSOLUTE COUNT: 5.8 K/UL (ref 1.8–7.7)
SEGMENTED NEUTROPHILS ABSOLUTE COUNT: 6.5 K/UL (ref 1.8–7.7)
SEGMENTED NEUTROPHILS ABSOLUTE COUNT: 6.7 K/UL (ref 1.8–7.7)
SEGMENTED NEUTROPHILS ABSOLUTE COUNT: 8.9 K/UL (ref 1.8–7.7)
SODIUM BLD-SCNC: 134 MMOL/L (ref 135–144)
SODIUM BLD-SCNC: 136 MMOL/L (ref 135–144)
SODIUM BLD-SCNC: 137 MMOL/L (ref 135–144)
SODIUM BLD-SCNC: 138 MMOL/L (ref 135–144)
SODIUM BLD-SCNC: 139 MMOL/L (ref 135–144)
SPECIFIC GRAVITY UA: 1.01 (ref 1–1.03)
SPECIFIC GRAVITY UA: 1.02
SPECIFIC GRAVITY UA: 1.02 (ref 1–1.03)
SPECIMEN DESCRIPTION: ABNORMAL
SPECIMEN DESCRIPTION: NORMAL
TOTAL PROTEIN: 6 G/DL (ref 6.4–8.3)
TRICHOMONAS: ABNORMAL
TRICHOMONAS: ABNORMAL
TROPONIN INTERP: ABNORMAL
TROPONIN T: ABNORMAL NG/ML
TROPONIN, HIGH SENSITIVITY: 65 NG/L (ref 0–22)
TROPONIN, HIGH SENSITIVITY: 68 NG/L (ref 0–22)
TROPONIN, HIGH SENSITIVITY: 75 NG/L (ref 0–22)
TROPONIN, HIGH SENSITIVITY: 77 NG/L (ref 0–22)
TROPONIN, HIGH SENSITIVITY: 78 NG/L (ref 0–22)
TROPONIN, HIGH SENSITIVITY: 80 NG/L (ref 0–22)
TURBIDITY: ABNORMAL
TURBIDITY: ABNORMAL
TURBIDITY: CLEAR
URINE HGB: ABNORMAL
URINE HGB: NEGATIVE
URINE HGB: NEGATIVE
UROBILINOGEN, URINE: NORMAL
WBC # BLD: 10.7 K/UL (ref 3.5–11)
WBC # BLD: 14.5 K/UL (ref 3.5–11)
WBC # BLD: 15 K/UL (ref 3.5–11)
WBC # BLD: 6.1 K/UL (ref 3.5–11)
WBC # BLD: 7.2 K/UL (ref 3.5–11)
WBC # BLD: 7.3 K/UL (ref 3.5–11)
WBC # BLD: 8.4 K/UL (ref 3.5–11)
WBC # BLD: 9.8 K/UL (ref 3.5–11)
WBC # BLD: ABNORMAL 10*3/UL
WBC UA: ABNORMAL /HPF
WBC UA: ABNORMAL /HPF (ref 0–5)
YEAST: ABNORMAL
YEAST: ABNORMAL

## 2019-01-01 PROCEDURE — 6370000000 HC RX 637 (ALT 250 FOR IP): Performed by: CLINICAL NURSE SPECIALIST

## 2019-01-01 PROCEDURE — 80048 BASIC METABOLIC PNL TOTAL CA: CPT

## 2019-01-01 PROCEDURE — 6360000002 HC RX W HCPCS: Performed by: INTERNAL MEDICINE

## 2019-01-01 PROCEDURE — 6360000002 HC RX W HCPCS: Performed by: CLINICAL NURSE SPECIALIST

## 2019-01-01 PROCEDURE — 99283 EMERGENCY DEPT VISIT LOW MDM: CPT

## 2019-01-01 PROCEDURE — 97162 PT EVAL MOD COMPLEX 30 MIN: CPT

## 2019-01-01 PROCEDURE — 96365 THER/PROPH/DIAG IV INF INIT: CPT

## 2019-01-01 PROCEDURE — 85025 COMPLETE CBC W/AUTO DIFF WBC: CPT

## 2019-01-01 PROCEDURE — 2580000003 HC RX 258: Performed by: NURSE PRACTITIONER

## 2019-01-01 PROCEDURE — 6370000000 HC RX 637 (ALT 250 FOR IP): Performed by: NURSE PRACTITIONER

## 2019-01-01 PROCEDURE — 36415 COLL VENOUS BLD VENIPUNCTURE: CPT

## 2019-01-01 PROCEDURE — 99222 1ST HOSP IP/OBS MODERATE 55: CPT | Performed by: INTERNAL MEDICINE

## 2019-01-01 PROCEDURE — 97535 SELF CARE MNGMENT TRAINING: CPT

## 2019-01-01 PROCEDURE — 1200000000 HC SEMI PRIVATE

## 2019-01-01 PROCEDURE — 99232 SBSQ HOSP IP/OBS MODERATE 35: CPT | Performed by: INTERNAL MEDICINE

## 2019-01-01 PROCEDURE — 84484 ASSAY OF TROPONIN QUANT: CPT

## 2019-01-01 PROCEDURE — 94760 N-INVAS EAR/PLS OXIMETRY 1: CPT

## 2019-01-01 PROCEDURE — 81001 URINALYSIS AUTO W/SCOPE: CPT

## 2019-01-01 PROCEDURE — 81003 URINALYSIS AUTO W/O SCOPE: CPT

## 2019-01-01 PROCEDURE — 84132 ASSAY OF SERUM POTASSIUM: CPT

## 2019-01-01 PROCEDURE — 94640 AIRWAY INHALATION TREATMENT: CPT

## 2019-01-01 PROCEDURE — 85027 COMPLETE CBC AUTOMATED: CPT

## 2019-01-01 PROCEDURE — 87086 URINE CULTURE/COLONY COUNT: CPT

## 2019-01-01 PROCEDURE — 71045 X-RAY EXAM CHEST 1 VIEW: CPT

## 2019-01-01 PROCEDURE — APPSS30 APP SPLIT SHARED TIME 16-30 MINUTES: Performed by: CLINICAL NURSE SPECIALIST

## 2019-01-01 PROCEDURE — 6360000002 HC RX W HCPCS: Performed by: EMERGENCY MEDICINE

## 2019-01-01 PROCEDURE — 6370000000 HC RX 637 (ALT 250 FOR IP): Performed by: INTERNAL MEDICINE

## 2019-01-01 PROCEDURE — 87205 SMEAR GRAM STAIN: CPT

## 2019-01-01 PROCEDURE — 2580000003 HC RX 258: Performed by: CLINICAL NURSE SPECIALIST

## 2019-01-01 PROCEDURE — 97116 GAIT TRAINING THERAPY: CPT

## 2019-01-01 PROCEDURE — 74177 CT ABD & PELVIS W/CONTRAST: CPT

## 2019-01-01 PROCEDURE — 97110 THERAPEUTIC EXERCISES: CPT

## 2019-01-01 PROCEDURE — 6360000004 HC RX CONTRAST MEDICATION: Performed by: EMERGENCY MEDICINE

## 2019-01-01 PROCEDURE — 99285 EMERGENCY DEPT VISIT HI MDM: CPT

## 2019-01-01 PROCEDURE — 87077 CULTURE AEROBIC IDENTIFY: CPT

## 2019-01-01 PROCEDURE — 2580000003 HC RX 258: Performed by: PHYSICIAN ASSISTANT

## 2019-01-01 PROCEDURE — 83605 ASSAY OF LACTIC ACID: CPT

## 2019-01-01 PROCEDURE — 6360000002 HC RX W HCPCS: Performed by: NURSE PRACTITIONER

## 2019-01-01 PROCEDURE — 87040 BLOOD CULTURE FOR BACTERIA: CPT

## 2019-01-01 PROCEDURE — 87186 SC STD MICRODIL/AGAR DIL: CPT

## 2019-01-01 PROCEDURE — 76775 US EXAM ABDO BACK WALL LIM: CPT

## 2019-01-01 PROCEDURE — 2580000003 HC RX 258: Performed by: INTERNAL MEDICINE

## 2019-01-01 PROCEDURE — 83735 ASSAY OF MAGNESIUM: CPT

## 2019-01-01 PROCEDURE — 83880 ASSAY OF NATRIURETIC PEPTIDE: CPT

## 2019-01-01 PROCEDURE — 87150 DNA/RNA AMPLIFIED PROBE: CPT

## 2019-01-01 PROCEDURE — 97166 OT EVAL MOD COMPLEX 45 MIN: CPT

## 2019-01-01 PROCEDURE — 94668 MNPJ CHEST WALL SBSQ: CPT

## 2019-01-01 PROCEDURE — 2580000003 HC RX 258: Performed by: EMERGENCY MEDICINE

## 2019-01-01 PROCEDURE — 97530 THERAPEUTIC ACTIVITIES: CPT

## 2019-01-01 PROCEDURE — G0103 PSA SCREENING: HCPCS

## 2019-01-01 PROCEDURE — 6360000002 HC RX W HCPCS: Performed by: PHYSICIAN ASSISTANT

## 2019-01-01 PROCEDURE — 80053 COMPREHEN METABOLIC PANEL: CPT

## 2019-01-01 PROCEDURE — 86403 PARTICLE AGGLUT ANTBDY SCRN: CPT

## 2019-01-01 PROCEDURE — APPSS30 APP SPLIT SHARED TIME 16-30 MINUTES: Performed by: NURSE PRACTITIONER

## 2019-01-01 PROCEDURE — 93306 TTE W/DOPPLER COMPLETE: CPT

## 2019-01-01 PROCEDURE — 99239 HOSP IP/OBS DSCHRG MGMT >30: CPT | Performed by: NURSE PRACTITIONER

## 2019-01-01 PROCEDURE — APPSS45 APP SPLIT SHARED TIME 31-45 MINUTES: Performed by: CLINICAL NURSE SPECIALIST

## 2019-01-01 PROCEDURE — APPSS45 APP SPLIT SHARED TIME 31-45 MINUTES: Performed by: NURSE PRACTITIONER

## 2019-01-01 PROCEDURE — 94667 MNPJ CHEST WALL 1ST: CPT

## 2019-01-01 PROCEDURE — 85610 PROTHROMBIN TIME: CPT

## 2019-01-01 PROCEDURE — 93005 ELECTROCARDIOGRAM TRACING: CPT | Performed by: EMERGENCY MEDICINE

## 2019-01-01 PROCEDURE — 71046 X-RAY EXAM CHEST 2 VIEWS: CPT

## 2019-01-01 RX ORDER — UREA 10 %
3 LOTION (ML) TOPICAL ONCE
Status: COMPLETED | OUTPATIENT
Start: 2019-01-01 | End: 2019-01-01

## 2019-01-01 RX ORDER — LEVOTHYROXINE SODIUM 0.03 MG/1
25 TABLET ORAL DAILY
Status: DISCONTINUED | OUTPATIENT
Start: 2019-01-01 | End: 2019-01-01 | Stop reason: HOSPADM

## 2019-01-01 RX ORDER — ONDANSETRON 2 MG/ML
4 INJECTION INTRAMUSCULAR; INTRAVENOUS EVERY 6 HOURS PRN
Status: DISCONTINUED | OUTPATIENT
Start: 2019-01-01 | End: 2019-01-01 | Stop reason: HOSPADM

## 2019-01-01 RX ORDER — CIPROFLOXACIN 500 MG/1
500 TABLET, FILM COATED ORAL EVERY 12 HOURS SCHEDULED
Qty: 20 TABLET | Refills: 0 | Status: SHIPPED | OUTPATIENT
Start: 2019-01-01 | End: 2019-01-01

## 2019-01-01 RX ORDER — DOCUSATE SODIUM 100 MG/1
100 CAPSULE, LIQUID FILLED ORAL 2 TIMES DAILY
Status: DISCONTINUED | OUTPATIENT
Start: 2019-01-01 | End: 2019-01-01

## 2019-01-01 RX ORDER — SODIUM CHLORIDE 9 MG/ML
INJECTION, SOLUTION INTRAVENOUS CONTINUOUS
Status: DISCONTINUED | OUTPATIENT
Start: 2019-01-01 | End: 2019-01-01

## 2019-01-01 RX ORDER — PAROXETINE HYDROCHLORIDE 20 MG/1
10 TABLET, FILM COATED ORAL DAILY
Status: DISCONTINUED | OUTPATIENT
Start: 2019-01-01 | End: 2019-01-01 | Stop reason: HOSPADM

## 2019-01-01 RX ORDER — PSEUDOEPHEDRINE HCL 30 MG
100 TABLET ORAL DAILY
Qty: 30 CAPSULE | Refills: 1
Start: 2019-01-01 | End: 2019-01-01

## 2019-01-01 RX ORDER — SULFAMETHOXAZOLE AND TRIMETHOPRIM 800; 160 MG/1; MG/1
1 TABLET ORAL EVERY 12 HOURS SCHEDULED
Status: DISCONTINUED | OUTPATIENT
Start: 2019-01-01 | End: 2019-01-01 | Stop reason: HOSPADM

## 2019-01-01 RX ORDER — SULFAMETHOXAZOLE AND TRIMETHOPRIM 800; 160 MG/1; MG/1
1 TABLET ORAL EVERY 12 HOURS SCHEDULED
Qty: 20 TABLET | Refills: 0 | Status: SHIPPED | OUTPATIENT
Start: 2019-01-01 | End: 2019-01-01

## 2019-01-01 RX ORDER — SODIUM CHLORIDE 0.9 % (FLUSH) 0.9 %
10 SYRINGE (ML) INJECTION EVERY 12 HOURS SCHEDULED
Status: DISCONTINUED | OUTPATIENT
Start: 2019-01-01 | End: 2019-01-01 | Stop reason: HOSPADM

## 2019-01-01 RX ORDER — BENZONATATE 100 MG/1
100 CAPSULE ORAL 3 TIMES DAILY PRN
Status: DISCONTINUED | OUTPATIENT
Start: 2019-01-01 | End: 2019-01-01 | Stop reason: HOSPADM

## 2019-01-01 RX ORDER — DOCUSATE SODIUM 100 MG/1
100 CAPSULE, LIQUID FILLED ORAL DAILY
Status: DISCONTINUED | OUTPATIENT
Start: 2019-01-01 | End: 2019-01-01 | Stop reason: HOSPADM

## 2019-01-01 RX ORDER — 0.9 % SODIUM CHLORIDE 0.9 %
1000 INTRAVENOUS SOLUTION INTRAVENOUS ONCE
Status: DISCONTINUED | OUTPATIENT
Start: 2019-01-01 | End: 2019-01-01

## 2019-01-01 RX ORDER — HEPARIN SODIUM 5000 [USP'U]/ML
5000 INJECTION, SOLUTION INTRAVENOUS; SUBCUTANEOUS EVERY 8 HOURS SCHEDULED
Status: DISCONTINUED | OUTPATIENT
Start: 2019-01-01 | End: 2019-01-01 | Stop reason: HOSPADM

## 2019-01-01 RX ORDER — FUROSEMIDE 20 MG/1
40 TABLET ORAL DAILY
Status: DISCONTINUED | OUTPATIENT
Start: 2019-01-01 | End: 2019-01-01 | Stop reason: HOSPADM

## 2019-01-01 RX ORDER — LEVOFLOXACIN 5 MG/ML
500 INJECTION, SOLUTION INTRAVENOUS ONCE
Status: COMPLETED | OUTPATIENT
Start: 2019-01-01 | End: 2019-01-01

## 2019-01-01 RX ORDER — TETRACYCLINE HYDROCHLORIDE 250 MG/1
250 CAPSULE ORAL 4 TIMES DAILY
Qty: 28 CAPSULE | Refills: 0 | Status: SHIPPED | OUTPATIENT
Start: 2019-01-01 | End: 2019-01-01

## 2019-01-01 RX ORDER — LISINOPRIL 5 MG/1
2.5 TABLET ORAL DAILY
Status: DISCONTINUED | OUTPATIENT
Start: 2019-01-01 | End: 2019-01-01 | Stop reason: HOSPADM

## 2019-01-01 RX ORDER — HYDROCHLOROTHIAZIDE 12.5 MG/1
12.5 CAPSULE, GELATIN COATED ORAL DAILY
COMMUNITY
End: 2019-01-01

## 2019-01-01 RX ORDER — ALBUTEROL SULFATE 2.5 MG/3ML
2.5 SOLUTION RESPIRATORY (INHALATION) EVERY 6 HOURS PRN
Status: DISCONTINUED | OUTPATIENT
Start: 2019-01-01 | End: 2019-01-01 | Stop reason: HOSPADM

## 2019-01-01 RX ORDER — CARVEDILOL 3.12 MG/1
3.12 TABLET ORAL 2 TIMES DAILY WITH MEALS
Qty: 60 TABLET | Refills: 0 | Status: SHIPPED | OUTPATIENT
Start: 2019-01-01 | End: 2019-01-01 | Stop reason: SDUPTHER

## 2019-01-01 RX ORDER — NICOTINE 21 MG/24HR
1 PATCH, TRANSDERMAL 24 HOURS TRANSDERMAL DAILY PRN
Status: DISCONTINUED | OUTPATIENT
Start: 2019-01-01 | End: 2019-01-01 | Stop reason: HOSPADM

## 2019-01-01 RX ORDER — TETRACYCLINE HYDROCHLORIDE 250 MG/1
250 CAPSULE ORAL 4 TIMES DAILY
Status: DISCONTINUED | OUTPATIENT
Start: 2019-01-01 | End: 2019-01-01 | Stop reason: HOSPADM

## 2019-01-01 RX ORDER — PANTOPRAZOLE SODIUM 40 MG/1
40 TABLET, DELAYED RELEASE ORAL
Status: DISCONTINUED | OUTPATIENT
Start: 2019-01-01 | End: 2019-01-01 | Stop reason: HOSPADM

## 2019-01-01 RX ORDER — LIDOCAINE 4 G/G
1 PATCH TOPICAL DAILY
Status: DISCONTINUED | OUTPATIENT
Start: 2019-01-01 | End: 2019-01-01 | Stop reason: HOSPADM

## 2019-01-01 RX ORDER — CIPROFLOXACIN 500 MG/1
500 TABLET, FILM COATED ORAL EVERY 12 HOURS SCHEDULED
Status: DISCONTINUED | OUTPATIENT
Start: 2019-01-01 | End: 2019-01-01 | Stop reason: HOSPADM

## 2019-01-01 RX ORDER — SODIUM CHLORIDE 0.9 % (FLUSH) 0.9 %
10 SYRINGE (ML) INJECTION PRN
Status: DISCONTINUED | OUTPATIENT
Start: 2019-01-01 | End: 2019-01-01 | Stop reason: HOSPADM

## 2019-01-01 RX ORDER — CLOPIDOGREL BISULFATE 75 MG/1
75 TABLET ORAL DAILY
Status: DISCONTINUED | OUTPATIENT
Start: 2019-01-01 | End: 2019-01-01 | Stop reason: HOSPADM

## 2019-01-01 RX ORDER — FUROSEMIDE 10 MG/ML
40 INJECTION INTRAMUSCULAR; INTRAVENOUS DAILY
Status: DISCONTINUED | OUTPATIENT
Start: 2019-01-01 | End: 2019-01-01

## 2019-01-01 RX ORDER — ACETAMINOPHEN 325 MG/1
650 TABLET ORAL EVERY 4 HOURS PRN
Status: DISCONTINUED | OUTPATIENT
Start: 2019-01-01 | End: 2019-01-01 | Stop reason: HOSPADM

## 2019-01-01 RX ORDER — 0.9 % SODIUM CHLORIDE 0.9 %
80 INTRAVENOUS SOLUTION INTRAVENOUS ONCE
Status: COMPLETED | OUTPATIENT
Start: 2019-01-01 | End: 2019-01-01

## 2019-01-01 RX ORDER — CARVEDILOL 3.12 MG/1
3.12 TABLET ORAL 2 TIMES DAILY WITH MEALS
Status: DISCONTINUED | OUTPATIENT
Start: 2019-01-01 | End: 2019-01-01 | Stop reason: HOSPADM

## 2019-01-01 RX ORDER — BENZONATATE 100 MG/1
100 CAPSULE ORAL 3 TIMES DAILY PRN
Qty: 21 CAPSULE | Refills: 0 | Status: SHIPPED | OUTPATIENT
Start: 2019-01-01 | End: 2019-01-01

## 2019-01-01 RX ORDER — ALBUTEROL SULFATE 2.5 MG/3ML
2.5 SOLUTION RESPIRATORY (INHALATION) EVERY 4 HOURS PRN
Status: DISCONTINUED | OUTPATIENT
Start: 2019-01-01 | End: 2019-01-01 | Stop reason: HOSPADM

## 2019-01-01 RX ORDER — ACETAMINOPHEN 325 MG/1
650 TABLET ORAL EVERY 4 HOURS PRN
Status: DISCONTINUED | OUTPATIENT
Start: 2019-01-01 | End: 2019-01-01 | Stop reason: SDUPTHER

## 2019-01-01 RX ORDER — PHENAZOPYRIDINE HYDROCHLORIDE 100 MG/1
200 TABLET, FILM COATED ORAL
Status: DISCONTINUED | OUTPATIENT
Start: 2019-01-01 | End: 2019-01-01 | Stop reason: ALTCHOICE

## 2019-01-01 RX ORDER — ALBUTEROL SULFATE 2.5 MG/3ML
2.5 SOLUTION RESPIRATORY (INHALATION) 2 TIMES DAILY
Status: DISCONTINUED | OUTPATIENT
Start: 2019-01-01 | End: 2019-01-01 | Stop reason: HOSPADM

## 2019-01-01 RX ORDER — LEVOFLOXACIN 250 MG/1
250 TABLET ORAL DAILY
Status: DISCONTINUED | OUTPATIENT
Start: 2019-01-01 | End: 2019-01-01 | Stop reason: HOSPADM

## 2019-01-01 RX ORDER — LEVOFLOXACIN 250 MG/1
250 TABLET ORAL DAILY
Qty: 8 TABLET | Refills: 0 | Status: SHIPPED | OUTPATIENT
Start: 2019-01-01 | End: 2019-01-01

## 2019-01-01 RX ORDER — FUROSEMIDE 40 MG/1
20 TABLET ORAL DAILY
Qty: 30 TABLET | Refills: 0 | DISCHARGE
Start: 2019-01-01 | End: 2019-01-01 | Stop reason: SDUPTHER

## 2019-01-01 RX ORDER — SIMVASTATIN 40 MG
40 TABLET ORAL NIGHTLY
Status: DISCONTINUED | OUTPATIENT
Start: 2019-01-01 | End: 2019-01-01 | Stop reason: HOSPADM

## 2019-01-01 RX ORDER — DOXYCYCLINE HYCLATE 100 MG
100 TABLET ORAL 2 TIMES DAILY
COMMUNITY
End: 2019-01-01

## 2019-01-01 RX ADMIN — SODIUM CHLORIDE 80 ML: 9 INJECTION, SOLUTION INTRAVENOUS at 19:24

## 2019-01-01 RX ADMIN — Medication 10 ML: at 08:22

## 2019-01-01 RX ADMIN — FUROSEMIDE 40 MG: 20 TABLET ORAL at 11:49

## 2019-01-01 RX ADMIN — ACETAMINOPHEN 650 MG: 325 TABLET ORAL at 06:37

## 2019-01-01 RX ADMIN — PANTOPRAZOLE SODIUM 40 MG: 40 TABLET, DELAYED RELEASE ORAL at 06:21

## 2019-01-01 RX ADMIN — ALBUTEROL SULFATE 2.5 MG: 2.5 SOLUTION RESPIRATORY (INHALATION) at 09:37

## 2019-01-01 RX ADMIN — PANTOPRAZOLE SODIUM 40 MG: 40 TABLET, DELAYED RELEASE ORAL at 06:23

## 2019-01-01 RX ADMIN — PAROXETINE 10 MG: 20 TABLET, FILM COATED ORAL at 10:07

## 2019-01-01 RX ADMIN — SIMVASTATIN 40 MG: 40 TABLET, FILM COATED ORAL at 20:21

## 2019-01-01 RX ADMIN — HEPARIN SODIUM 5000 UNITS: 5000 INJECTION INTRAVENOUS; SUBCUTANEOUS at 23:22

## 2019-01-01 RX ADMIN — HEPARIN SODIUM 5000 UNITS: 5000 INJECTION INTRAVENOUS; SUBCUTANEOUS at 20:59

## 2019-01-01 RX ADMIN — SIMVASTATIN 40 MG: 40 TABLET, FILM COATED ORAL at 20:11

## 2019-01-01 RX ADMIN — CEFTRIAXONE SODIUM 1 G: 1 INJECTION, POWDER, FOR SOLUTION INTRAMUSCULAR; INTRAVENOUS at 20:50

## 2019-01-01 RX ADMIN — CLOPIDOGREL BISULFATE 75 MG: 75 TABLET ORAL at 10:07

## 2019-01-01 RX ADMIN — ACETAMINOPHEN 650 MG: 325 TABLET ORAL at 19:30

## 2019-01-01 RX ADMIN — FUROSEMIDE 40 MG: 20 TABLET ORAL at 08:42

## 2019-01-01 RX ADMIN — ALBUTEROL SULFATE 2.5 MG: 2.5 SOLUTION RESPIRATORY (INHALATION) at 09:29

## 2019-01-01 RX ADMIN — HEPARIN SODIUM 5000 UNITS: 5000 INJECTION INTRAVENOUS; SUBCUTANEOUS at 14:44

## 2019-01-01 RX ADMIN — CARVEDILOL 3.12 MG: 3.12 TABLET, FILM COATED ORAL at 08:26

## 2019-01-01 RX ADMIN — ACETAMINOPHEN 650 MG: 325 TABLET ORAL at 22:20

## 2019-01-01 RX ADMIN — LEVOTHYROXINE SODIUM 25 MCG: 25 TABLET ORAL at 06:56

## 2019-01-01 RX ADMIN — DOCUSATE SODIUM 100 MG: 100 CAPSULE, LIQUID FILLED ORAL at 08:48

## 2019-01-01 RX ADMIN — ALBUTEROL SULFATE 2.5 MG: 2.5 SOLUTION RESPIRATORY (INHALATION) at 13:29

## 2019-01-01 RX ADMIN — DOCUSATE SODIUM 100 MG: 100 CAPSULE, LIQUID FILLED ORAL at 08:25

## 2019-01-01 RX ADMIN — PHENAZOPYRIDINE HYDROCHLORIDE 200 MG: 100 TABLET ORAL at 17:00

## 2019-01-01 RX ADMIN — Medication 10 ML: at 08:25

## 2019-01-01 RX ADMIN — Medication 10 ML: at 20:11

## 2019-01-01 RX ADMIN — LEVOTHYROXINE SODIUM 25 MCG: 25 TABLET ORAL at 08:53

## 2019-01-01 RX ADMIN — PAROXETINE 10 MG: 20 TABLET, FILM COATED ORAL at 08:58

## 2019-01-01 RX ADMIN — SODIUM CHLORIDE: 9 INJECTION, SOLUTION INTRAVENOUS at 12:39

## 2019-01-01 RX ADMIN — ENOXAPARIN SODIUM 30 MG: 30 INJECTION SUBCUTANEOUS at 08:44

## 2019-01-01 RX ADMIN — SIMVASTATIN 40 MG: 40 TABLET, FILM COATED ORAL at 21:37

## 2019-01-01 RX ADMIN — SULFAMETHOXAZOLE AND TRIMETHOPRIM 1 TABLET: 800; 160 TABLET ORAL at 08:25

## 2019-01-01 RX ADMIN — DOCUSATE SODIUM 100 MG: 100 CAPSULE, LIQUID FILLED ORAL at 08:21

## 2019-01-01 RX ADMIN — SODIUM CHLORIDE: 9 INJECTION, SOLUTION INTRAVENOUS at 20:21

## 2019-01-01 RX ADMIN — IOVERSOL 75 ML: 741 INJECTION INTRA-ARTERIAL; INTRAVENOUS at 19:24

## 2019-01-01 RX ADMIN — ACETAMINOPHEN 650 MG: 325 TABLET ORAL at 21:37

## 2019-01-01 RX ADMIN — Medication 10 ML: at 20:59

## 2019-01-01 RX ADMIN — HEPARIN SODIUM 5000 UNITS: 5000 INJECTION INTRAVENOUS; SUBCUTANEOUS at 06:23

## 2019-01-01 RX ADMIN — CARVEDILOL 3.12 MG: 3.12 TABLET, FILM COATED ORAL at 20:59

## 2019-01-01 RX ADMIN — PANTOPRAZOLE SODIUM 40 MG: 40 TABLET, DELAYED RELEASE ORAL at 08:53

## 2019-01-01 RX ADMIN — PAROXETINE 10 MG: 20 TABLET, FILM COATED ORAL at 08:26

## 2019-01-01 RX ADMIN — ENOXAPARIN SODIUM 40 MG: 40 INJECTION SUBCUTANEOUS at 08:53

## 2019-01-01 RX ADMIN — PAROXETINE 10 MG: 20 TABLET, FILM COATED ORAL at 08:42

## 2019-01-01 RX ADMIN — CEFTRIAXONE SODIUM 1 G: 1 INJECTION, POWDER, FOR SOLUTION INTRAMUSCULAR; INTRAVENOUS at 21:37

## 2019-01-01 RX ADMIN — ALBUTEROL SULFATE 2.5 MG: 2.5 SOLUTION RESPIRATORY (INHALATION) at 08:57

## 2019-01-01 RX ADMIN — SODIUM CHLORIDE: 9 INJECTION, SOLUTION INTRAVENOUS at 01:47

## 2019-01-01 RX ADMIN — PAROXETINE 10 MG: 20 TABLET, FILM COATED ORAL at 08:21

## 2019-01-01 RX ADMIN — LEVOFLOXACIN 250 MG: 250 TABLET, FILM COATED ORAL at 08:25

## 2019-01-01 RX ADMIN — ACETAMINOPHEN 650 MG: 325 TABLET ORAL at 15:01

## 2019-01-01 RX ADMIN — CLOPIDOGREL BISULFATE 75 MG: 75 TABLET ORAL at 08:53

## 2019-01-01 RX ADMIN — PHENAZOPYRIDINE HYDROCHLORIDE 200 MG: 100 TABLET ORAL at 14:04

## 2019-01-01 RX ADMIN — SIMVASTATIN 40 MG: 40 TABLET, FILM COATED ORAL at 23:22

## 2019-01-01 RX ADMIN — PHENAZOPYRIDINE HYDROCHLORIDE 200 MG: 100 TABLET ORAL at 08:21

## 2019-01-01 RX ADMIN — ACETAMINOPHEN 650 MG: 325 TABLET ORAL at 17:05

## 2019-01-01 RX ADMIN — Medication 10 ML: at 08:54

## 2019-01-01 RX ADMIN — LEVOTHYROXINE SODIUM 25 MCG: 25 TABLET ORAL at 06:39

## 2019-01-01 RX ADMIN — LEVOFLOXACIN 250 MG: 250 TABLET, FILM COATED ORAL at 08:48

## 2019-01-01 RX ADMIN — PANTOPRAZOLE SODIUM 40 MG: 40 TABLET, DELAYED RELEASE ORAL at 06:56

## 2019-01-01 RX ADMIN — ALBUTEROL SULFATE 2.5 MG: 2.5 SOLUTION RESPIRATORY (INHALATION) at 18:21

## 2019-01-01 RX ADMIN — DOCUSATE SODIUM 100 MG: 100 CAPSULE, LIQUID FILLED ORAL at 10:07

## 2019-01-01 RX ADMIN — CLOPIDOGREL BISULFATE 75 MG: 75 TABLET ORAL at 08:22

## 2019-01-01 RX ADMIN — PANTOPRAZOLE SODIUM 40 MG: 40 TABLET, DELAYED RELEASE ORAL at 07:29

## 2019-01-01 RX ADMIN — LEVOFLOXACIN 500 MG: 5 INJECTION, SOLUTION INTRAVENOUS at 16:47

## 2019-01-01 RX ADMIN — LEVOTHYROXINE SODIUM 25 MCG: 25 TABLET ORAL at 06:17

## 2019-01-01 RX ADMIN — PAROXETINE 10 MG: 20 TABLET, FILM COATED ORAL at 08:22

## 2019-01-01 RX ADMIN — LISINOPRIL 2.5 MG: 5 TABLET ORAL at 08:25

## 2019-01-01 RX ADMIN — SIMVASTATIN 40 MG: 40 TABLET, FILM COATED ORAL at 19:30

## 2019-01-01 RX ADMIN — CEFTRIAXONE SODIUM 1 G: 1 INJECTION, POWDER, FOR SOLUTION INTRAMUSCULAR; INTRAVENOUS at 19:30

## 2019-01-01 RX ADMIN — Medication 10 ML: at 08:21

## 2019-01-01 RX ADMIN — SODIUM CHLORIDE: 9 INJECTION, SOLUTION INTRAVENOUS at 06:36

## 2019-01-01 RX ADMIN — LEVOTHYROXINE SODIUM 25 MCG: 25 TABLET ORAL at 06:21

## 2019-01-01 RX ADMIN — LEVOTHYROXINE SODIUM 25 MCG: 25 TABLET ORAL at 07:29

## 2019-01-01 RX ADMIN — PAROXETINE 10 MG: 20 TABLET, FILM COATED ORAL at 08:48

## 2019-01-01 RX ADMIN — LEVOFLOXACIN 250 MG: 250 TABLET, FILM COATED ORAL at 08:21

## 2019-01-01 RX ADMIN — PHENAZOPYRIDINE HYDROCHLORIDE 200 MG: 100 TABLET ORAL at 08:42

## 2019-01-01 RX ADMIN — PHENAZOPYRIDINE HYDROCHLORIDE 200 MG: 100 TABLET ORAL at 17:05

## 2019-01-01 RX ADMIN — FUROSEMIDE 40 MG: 20 TABLET ORAL at 08:57

## 2019-01-01 RX ADMIN — HEPARIN SODIUM 5000 UNITS: 5000 INJECTION INTRAVENOUS; SUBCUTANEOUS at 15:01

## 2019-01-01 RX ADMIN — ALBUTEROL SULFATE 2.5 MG: 2.5 SOLUTION RESPIRATORY (INHALATION) at 20:04

## 2019-01-01 RX ADMIN — SULFAMETHOXAZOLE AND TRIMETHOPRIM 1 TABLET: 800; 160 TABLET ORAL at 20:59

## 2019-01-01 RX ADMIN — SODIUM CHLORIDE 1000 ML: 9 INJECTION, SOLUTION INTRAVENOUS at 15:32

## 2019-01-01 RX ADMIN — FUROSEMIDE 40 MG: 10 INJECTION, SOLUTION INTRAMUSCULAR; INTRAVENOUS at 08:20

## 2019-01-01 RX ADMIN — HEPARIN SODIUM 5000 UNITS: 5000 INJECTION INTRAVENOUS; SUBCUTANEOUS at 22:01

## 2019-01-01 RX ADMIN — CLOPIDOGREL BISULFATE 75 MG: 75 TABLET ORAL at 08:42

## 2019-01-01 RX ADMIN — CLOPIDOGREL BISULFATE 75 MG: 75 TABLET ORAL at 08:21

## 2019-01-01 RX ADMIN — CLOPIDOGREL BISULFATE 75 MG: 75 TABLET ORAL at 08:48

## 2019-01-01 RX ADMIN — SIMVASTATIN 40 MG: 40 TABLET, FILM COATED ORAL at 22:21

## 2019-01-01 RX ADMIN — Medication 3 MG: at 00:35

## 2019-01-01 RX ADMIN — CLOPIDOGREL BISULFATE 75 MG: 75 TABLET ORAL at 08:27

## 2019-01-01 RX ADMIN — FUROSEMIDE 40 MG: 20 TABLET ORAL at 08:27

## 2019-01-01 RX ADMIN — LEVOTHYROXINE SODIUM 25 MCG: 25 TABLET ORAL at 06:23

## 2019-01-01 RX ADMIN — ENOXAPARIN SODIUM 30 MG: 30 INJECTION SUBCUTANEOUS at 08:21

## 2019-01-01 RX ADMIN — PANTOPRAZOLE SODIUM 40 MG: 40 TABLET, DELAYED RELEASE ORAL at 06:39

## 2019-01-01 RX ADMIN — HEPARIN SODIUM 5000 UNITS: 5000 INJECTION INTRAVENOUS; SUBCUTANEOUS at 06:39

## 2019-01-01 RX ADMIN — PANTOPRAZOLE SODIUM 40 MG: 40 TABLET, DELAYED RELEASE ORAL at 06:17

## 2019-01-01 RX ADMIN — SIMVASTATIN 40 MG: 40 TABLET, FILM COATED ORAL at 20:59

## 2019-01-01 RX ADMIN — CARVEDILOL 3.12 MG: 3.12 TABLET, FILM COATED ORAL at 15:01

## 2019-01-01 RX ADMIN — Medication 10 ML: at 19:25

## 2019-01-01 RX ADMIN — FUROSEMIDE 40 MG: 20 TABLET ORAL at 08:22

## 2019-01-01 RX ADMIN — HEPARIN SODIUM 5000 UNITS: 5000 INJECTION INTRAVENOUS; SUBCUTANEOUS at 06:56

## 2019-01-01 RX ADMIN — CEFTRIAXONE SODIUM 1 G: 1 INJECTION, POWDER, FOR SOLUTION INTRAMUSCULAR; INTRAVENOUS at 20:11

## 2019-01-01 RX ADMIN — VANCOMYCIN HYDROCHLORIDE 1500 MG: 1 INJECTION, POWDER, LYOPHILIZED, FOR SOLUTION INTRAVENOUS at 14:44

## 2019-01-01 RX ADMIN — PHENAZOPYRIDINE HYDROCHLORIDE 200 MG: 100 TABLET ORAL at 12:39

## 2019-01-01 RX ADMIN — ALBUTEROL SULFATE 2.5 MG: 2.5 SOLUTION RESPIRATORY (INHALATION) at 20:37

## 2019-01-01 ASSESSMENT — PAIN SCALES - GENERAL
PAINLEVEL_OUTOF10: 0
PAINLEVEL_OUTOF10: 0
PAINLEVEL_OUTOF10: 2
PAINLEVEL_OUTOF10: 3
PAINLEVEL_OUTOF10: 0
PAINLEVEL_OUTOF10: 1
PAINLEVEL_OUTOF10: 0
PAINLEVEL_OUTOF10: 10
PAINLEVEL_OUTOF10: 4
PAINLEVEL_OUTOF10: 0
PAINLEVEL_OUTOF10: 4
PAINLEVEL_OUTOF10: 0
PAINLEVEL_OUTOF10: 0
PAINLEVEL_OUTOF10: 3
PAINLEVEL_OUTOF10: 0
PAINLEVEL_OUTOF10: 0
PAINLEVEL_OUTOF10: 4
PAINLEVEL_OUTOF10: 0
PAINLEVEL_OUTOF10: 0
PAINLEVEL_OUTOF10: 4
PAINLEVEL_OUTOF10: 0
PAINLEVEL_OUTOF10: 4
PAINLEVEL_OUTOF10: 0
PAINLEVEL_OUTOF10: 0
PAINLEVEL_OUTOF10: 9
PAINLEVEL_OUTOF10: 0
PAINLEVEL_OUTOF10: 2

## 2019-01-01 ASSESSMENT — ENCOUNTER SYMPTOMS
SHORTNESS OF BREATH: 0
EYE DISCHARGE: 0
SORE THROAT: 0
SHORTNESS OF BREATH: 0
NAUSEA: 0
ABDOMINAL PAIN: 0
SORE THROAT: 0
EYE DISCHARGE: 0
SHORTNESS OF BREATH: 1
VOMITING: 0
NAUSEA: 0
DIARRHEA: 1
VOMITING: 0
BACK PAIN: 1
EYE REDNESS: 0
GASTROINTESTINAL NEGATIVE: 1
COUGH: 0
PHOTOPHOBIA: 0
ABDOMINAL PAIN: 0
COUGH: 1
ABDOMINAL PAIN: 0
COUGH: 1
ABDOMINAL PAIN: 0
NAUSEA: 0
WHEEZING: 1
CONSTIPATION: 0
COUGH: 1
TROUBLE SWALLOWING: 0
EYE REDNESS: 0
PHOTOPHOBIA: 0
VOMITING: 0
SORE THROAT: 0
NAUSEA: 0
NAUSEA: 0
VOMITING: 0
PHOTOPHOBIA: 0
DIARRHEA: 1
ABDOMINAL PAIN: 0
SORE THROAT: 0
WHEEZING: 0
VOMITING: 0
NAUSEA: 0
SHORTNESS OF BREATH: 1
COLOR CHANGE: 0
ALLERGIC/IMMUNOLOGIC NEGATIVE: 1
TROUBLE SWALLOWING: 0
COUGH: 1
SINUS PAIN: 0
TROUBLE SWALLOWING: 0
SHORTNESS OF BREATH: 1
SHORTNESS OF BREATH: 1
COUGH: 1
BACK PAIN: 0
SORE THROAT: 0
ABDOMINAL PAIN: 0
COUGH: 0

## 2019-01-01 ASSESSMENT — PAIN DESCRIPTION - FREQUENCY
FREQUENCY: INTERMITTENT
FREQUENCY: INTERMITTENT

## 2019-01-01 ASSESSMENT — PAIN DESCRIPTION - DESCRIPTORS
DESCRIPTORS: ACHING
DESCRIPTORS: BURNING
DESCRIPTORS: ACHING

## 2019-01-01 ASSESSMENT — PAIN DESCRIPTION - LOCATION
LOCATION: BUTTOCKS
LOCATION: HEAD
LOCATION: PERINEUM
LOCATION: ANKLE
LOCATION: PERINEUM
LOCATION: BACK;BUTTOCKS
LOCATION: HEAD
LOCATION: ANKLE
LOCATION: ANKLE

## 2019-01-01 ASSESSMENT — PAIN DESCRIPTION - PAIN TYPE
TYPE: ACUTE PAIN
TYPE: ACUTE PAIN
TYPE: CHRONIC PAIN
TYPE: ACUTE PAIN
TYPE: CHRONIC PAIN
TYPE: ACUTE PAIN
TYPE: ACUTE PAIN
TYPE: CHRONIC PAIN

## 2019-01-01 ASSESSMENT — PAIN - FUNCTIONAL ASSESSMENT
PAIN_FUNCTIONAL_ASSESSMENT: ACTIVITIES ARE NOT PREVENTED
PAIN_FUNCTIONAL_ASSESSMENT: ACTIVITIES ARE NOT PREVENTED

## 2019-01-01 ASSESSMENT — PAIN DESCRIPTION - PROGRESSION: CLINICAL_PROGRESSION: NOT CHANGED

## 2019-06-26 ENCOUNTER — HOSPITAL ENCOUNTER (OUTPATIENT)
Age: 84
Discharge: HOME | End: 2019-06-26
Payer: MEDICARE

## 2019-06-26 ENCOUNTER — APPOINTMENT (OUTPATIENT)
Dept: CT IMAGING | Age: 84
End: 2019-06-26
Payer: MEDICARE

## 2019-06-26 ENCOUNTER — HOSPITAL ENCOUNTER (EMERGENCY)
Age: 84
Discharge: HOME | End: 2019-06-26
Attending: EMERGENCY MEDICINE
Payer: MEDICARE

## 2019-06-26 ENCOUNTER — HOSPITAL ENCOUNTER (OUTPATIENT)
Age: 84
End: 2019-06-26
Payer: MEDICARE

## 2019-06-26 VITALS
BODY MASS INDEX: 30.73 KG/M2 | HEIGHT: 64 IN | HEART RATE: 78 BPM | WEIGHT: 180 LBS | DIASTOLIC BLOOD PRESSURE: 65 MMHG | OXYGEN SATURATION: 93 % | TEMPERATURE: 98.2 F | SYSTOLIC BLOOD PRESSURE: 132 MMHG | RESPIRATION RATE: 16 BRPM
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
## 2019-06-26 DIAGNOSIS — S39.012A BACK STRAIN, INITIAL ENCOUNTER: ICD-10-CM

## 2019-06-26 DIAGNOSIS — M54.2 NECK PAIN: ICD-10-CM

## 2019-06-26 LAB
ABSOLUTE EOS #: 0.21 K/UL (ref 0–0.4)
ABSOLUTE IMMATURE GRANULOCYTE: 0 K/UL (ref 0–0.3)
ABSOLUTE LYMPH #: 0.62 K/UL (ref 1–4.8)
ABSOLUTE MONO #: 0.07 K/UL (ref 0.1–0.8)
ALBUMIN SERPL-MCNC: 3.7 G/DL (ref 3.5–5.2)
ALBUMIN/GLOBULIN RATIO: 1.5 (ref 1–2.5)
ALP BLD-CCNC: 79 U/L (ref 40–129)
ALT SERPL-CCNC: 11 U/L (ref 5–41)
ANION GAP SERPL CALCULATED.3IONS-SCNC: 14 MMOL/L (ref 9–17)
AST SERPL-CCNC: 21 U/L
BASOPHILS # BLD: 0 % (ref 0–2)
BASOPHILS ABSOLUTE: 0 K/UL (ref 0–0.2)
BILIRUB SERPL-MCNC: 0.76 MG/DL (ref 0.3–1.2)
BNP INTERPRETATION: ABNORMAL
BUN BLDV-MCNC: 30 MG/DL (ref 8–23)
BUN/CREAT BLD: ABNORMAL (ref 9–20)
CALCIUM SERPL-MCNC: 8.7 MG/DL (ref 8.6–10.4)
CHLORIDE BLD-SCNC: 111 MMOL/L (ref 98–107)
CHOLESTEROL/HDL RATIO: 2.5
CHOLESTEROL: 89 MG/DL
CO2: 18 MMOL/L (ref 20–31)
CREAT SERPL-MCNC: 1.14 MG/DL (ref 0.7–1.2)
DIFFERENTIAL TYPE: ABNORMAL
EOSINOPHILS RELATIVE PERCENT: 3 % (ref 1–4)
GFR AFRICAN AMERICAN: >60 ML/MIN
GFR NON-AFRICAN AMERICAN: >60 ML/MIN
GFR SERPL CREATININE-BSD FRML MDRD: ABNORMAL ML/MIN/{1.73_M2}
GFR SERPL CREATININE-BSD FRML MDRD: ABNORMAL ML/MIN/{1.73_M2}
GLUCOSE BLD-MCNC: 101 MG/DL (ref 70–99)
HCT VFR BLD CALC: 42.8 % (ref 40.7–50.3)
HDLC SERPL-MCNC: 36 MG/DL
HEMOGLOBIN: 13.8 G/DL (ref 13–17)
IMMATURE GRANULOCYTES: 0 %
LDL CHOLESTEROL: 22 MG/DL (ref 0–130)
LYMPHOCYTES # BLD: 9 % (ref 24–44)
MCH RBC QN AUTO: 31.8 PG (ref 25.2–33.5)
MCHC RBC AUTO-ENTMCNC: 32.2 G/DL (ref 28.4–34.8)
MCV RBC AUTO: 98.6 FL (ref 82.6–102.9)
MONOCYTES # BLD: 1 % (ref 1–7)
MORPHOLOGY: NORMAL
NRBC AUTOMATED: 0 PER 100 WBC
PDW BLD-RTO: 13.6 % (ref 11.8–14.4)
PLATELET # BLD: 175 K/UL (ref 138–453)
PLATELET ESTIMATE: ABNORMAL
PMV BLD AUTO: 10.2 FL (ref 8.1–13.5)
POTASSIUM SERPL-SCNC: 4.5 MMOL/L (ref 3.7–5.3)
PRO-BNP: 5615 PG/ML
RBC # BLD: 4.34 M/UL (ref 4.21–5.77)
RBC # BLD: ABNORMAL 10*6/UL
SEG NEUTROPHILS: 87 % (ref 36–66)
SEGMENTED NEUTROPHILS ABSOLUTE COUNT: 6 K/UL (ref 1.8–7.7)
SODIUM BLD-SCNC: 143 MMOL/L (ref 135–144)
THYROXINE, FREE: 1.06 NG/DL (ref 0.93–1.7)
TOTAL PROTEIN: 6.2 G/DL (ref 6.4–8.3)
TRIGL SERPL-MCNC: 153 MG/DL
TSH SERPL DL<=0.05 MIU/L-ACNC: 6.39 MIU/L (ref 0.3–5)
VLDLC SERPL CALC-MCNC: ABNORMAL MG/DL (ref 1–30)
WBC # BLD: 6.9 K/UL (ref 3.5–11.3)
WBC # BLD: ABNORMAL 10*3/UL

## 2019-06-26 PROCEDURE — 83880 ASSAY OF NATRIURETIC PEPTIDE: CPT

## 2019-06-26 PROCEDURE — 84439 ASSAY OF FREE THYROXINE: CPT

## 2019-06-26 PROCEDURE — 70450 CT HEAD/BRAIN W/O DYE: CPT

## 2019-06-26 PROCEDURE — 36415 COLL VENOUS BLD VENIPUNCTURE: CPT

## 2019-06-26 PROCEDURE — 72125 CT NECK SPINE W/O DYE: CPT

## 2019-06-26 PROCEDURE — 85025 COMPLETE CBC W/AUTO DIFF WBC: CPT

## 2019-06-26 PROCEDURE — 84443 ASSAY THYROID STIM HORMONE: CPT

## 2019-06-26 PROCEDURE — 80053 COMPREHEN METABOLIC PANEL: CPT

## 2019-06-26 PROCEDURE — 99283 EMERGENCY DEPT VISIT LOW MDM: CPT

## 2019-06-26 PROCEDURE — 71250 CT THORAX DX C-: CPT

## 2019-06-26 PROCEDURE — 80061 LIPID PANEL: CPT

## 2019-06-26 RX ORDER — GINSENG 100 MG
CAPSULE ORAL ONCE
Status: DISCONTINUED | OUTPATIENT
Start: 2019-06-26 | End: 2019-06-26 | Stop reason: HOSPADM

## 2019-06-26 RX ORDER — LIDOCAINE 50 MG/G
1 PATCH TOPICAL DAILY
Qty: 15 PATCH | Refills: 0 | Status: SHIPPED | OUTPATIENT
Start: 2019-06-26 | End: 2019-01-01

## 2019-06-26 ASSESSMENT — ENCOUNTER SYMPTOMS
COUGH: 0
NAUSEA: 0
SORE THROAT: 0
EYE DISCHARGE: 0
SHORTNESS OF BREATH: 0
EYE REDNESS: 0
ABDOMINAL PAIN: 0
BACK PAIN: 1
VOMITING: 0

## 2019-06-26 ASSESSMENT — PAIN DESCRIPTION - PAIN TYPE: TYPE: ACUTE PAIN

## 2019-06-26 ASSESSMENT — PAIN SCALES - GENERAL: PAINLEVEL_OUTOF10: 8

## 2019-06-26 ASSESSMENT — PAIN DESCRIPTION - ORIENTATION: ORIENTATION: LEFT;LOWER

## 2019-06-26 ASSESSMENT — PAIN DESCRIPTION - LOCATION: LOCATION: BACK

## 2019-06-26 NOTE — ED NOTES
Patient cleared for discharge per MD. Patient discharge instructions explained, Rx given and explained to patient. Patient Verbalized understanding of all instructions and all patient questions answered to their satisfaction. Patient departs from ED in stable condition.         Phyllis Quiñonez RN  06/26/19 [id number]

## 2019-06-26 NOTE — ED PROVIDER NOTES
Gaurav Andersen MD (06/26/19 11:53:05)                Impression:    Severe multilevel disc and facet degenerative changes.  No evidence of acute  traumatic malalignment or fracture.                      CT CHEST WO CONTRAST (Preliminary result)   Result time 06/26/19 12:05:13   Preliminary result by Otoniel Dangelo MD (06/26/19 12:05:13)                Impression:    Cardiomegaly with bilateral layering pleural effusions and interstitial  pulmonary edema suggesting CHF.  This is superimposed on stable chronic mild  pulmonary fibrosis noted in the lung bases. Moderate centrilobular emphysema. No new or enlarging pulmonary nodules.  Resolution of the previously seen  right apical pulmonary nodule. Cholelithiasis without evidence of acute cholecystitis. No evidence of an acute traumatic displaced left rib fracture. Narrative:    EXAMINATION:  CT OF THE CHEST WITHOUT CONTRAST, 6/26/2019 11:25 am    TECHNIQUE:  CT of the chest was performed without the administration of intravenous  contrast. Multiplanar reformatted images are provided for review. Dose  modulation, iterative reconstruction, and/or weight based adjustment of the  mA/kV was utilized to reduce the radiation dose to as low as reasonably  achievable. COMPARISON:  09/08/2015 chest CT, 04/24/2018 soft tissue neck CT. HISTORY:  ORDERING SYSTEM PROVIDED HISTORY: Left posterior rib pain. TECHNOLOGIST PROVIDED HISTORY:  Ordering Physician Provided Reason for Exam: Left-sided posterior lower rib  pain. Acuity: Acute  Type of Exam: Initial  Mechanism of Injury: Fall    FINDINGS:  Mediastinum:  Thyroid is grossly unremarkable within the limits of CT.   Moderate to severe coronary and aortic atherosclerotic calcifications.  No  evidence of aortic aneurysm. Mercie Spitz is a left chest wall cardiac pacer device  with leads in expected positions. Mercie Spitz is cardiomegaly.  No pericardial  effusion.  No evidence of mediastinal, hilar or axillary

## 2019-08-23 PROBLEM — N39.0 UTI (URINARY TRACT INFECTION): Status: ACTIVE | Noted: 2019-01-01

## 2019-08-23 NOTE — ED PROVIDER NOTES
Emergency Department         COMPLAINT       Chief Complaint   Patient presents with    Fatigue      PHYSICAL EXAM      ED Triage Vitals [08/23/19 1813]   BP Temp Temp Source Pulse Resp SpO2 Height Weight   116/61 97.5 °F (36.4 °C) Oral 80 16 96 % 5' 5\" (1.651 m) 179 lb (81.2 kg)         Constitutional: Alert, oriented x3, nontoxic, answering questions appropriately, acting properly for age, in no acute distress   HEENT: Extraocular muscles intact, mucus membranes moist  Neck: Trachea midline   Cardiovascular: Regular rhythm and rate no S3, S4, or murmurs   Respiratory: Diminished and clear to auscultation bilaterally no wheezes, rhonchi, rales, no respiratory distress no tachypnea no retractions no hypoxia  Gastrointestinal: Soft, nontender, colostomy with area of distention around it suspect hernia, positive bowel sounds. No rebound, rigidity, or guarding. Musculoskeletal: No extremity pain or swelling   Neurologic: Moving all 4 extremities without difficulty there are no gross focal neurologic deficits   Skin: Warm and dry       Physical Exam  DIAGNOSTIC RESULTS     EKG: All EKG's are interpreted by the Emergency Department Physician who either signs or Co-signs this chart in the absence of a cardiologist.    Not indicated unless otherwise documented above or in the midlevel documentation    LABS:  No results found for this visit on 08/23/19. Not indicated unless otherwise documented above or in the midlevel documentation    RADIOLOGY:   I reviewedthe radiologist interpretations:  XR CHEST PORTABLE    (Results Pending)   CT ABDOMEN PELVIS W IV CONTRAST Additional Contrast? None    (Results Pending)       Not indicated unless otherwise documented above or in the midlevel documentation    EMERGENCY DEPARTMENT COURSE:       PERTINENT ATTENDING PHYSICIAN COMMENTS:    Fatigue. Dizziness. Denies headache. No chest pain or shortness of breath no fevers or chills. IV and labs.     Care transferred to

## 2019-08-23 NOTE — LETTER
suppliers that help patients recover after discharge from the hospital, including skilled nursing facilities, home health agencies, inpatient rehabilitation facilities, and long term care hospitals. [facility] is working closely with the doctors and other health care providers that care for you during and following your hospital stay and for a period of time after you leave the hospital. By working together, the health care providers are trying to more efficiently provide well-managed, high quality, patient-centered care as you undergo treatment. Hospitals, doctors, and other health care providers that care for you following a hospital stay may receive an additional payment for providing better, more coordinated health care. Medicare will monitor your care to make sure you and others get high quality care. Your feedback is important     Medicare may also ask you to answer a survey about the services and care you received from [facility] will be mailed to you. Your feedback will improve care for all people with Medicare who receive care from [facility]. Completion of this survey is optional.     Get more information     For more information about the Bundled Payments for [address] [place] [address], you can:    · Visit the CMS BPCI Advanced Website at [email or web address]-meeks.net/ initiatives/bpci-advanced   · Call the [facility] BPCI-A team at (336) 539-5127. · Call 1-800-MEDICARE (8-400.794.5089). TTY users can call 8-158.914.3343     If you have concerns or complaints about your care, talk to your health care provider, or contact your Beneficiary and Family Centered Quality Improvement Organization [facility]). To get your CC-QIO's phone number, visit Medicare.gov/contacts or call 1-800-MEDICARE. · To find a different hospital, visit www. [email or web address].gov or call

## 2019-08-24 PROBLEM — K43.5 PARASTOMAL HERNIA WITHOUT OBSTRUCTION OR GANGRENE: Status: ACTIVE | Noted: 2019-01-01

## 2019-08-24 PROBLEM — A49.9 BACTERIAL UTI: Status: ACTIVE | Noted: 2019-01-01

## 2019-08-24 NOTE — PROGRESS NOTES
angioplasty with stent (9/11/2013, 2003?); eye surgery (Bilateral); Total knee arthroplasty (Bilateral, 2008 (R) 2004 (L)); Colonoscopy; Revision total knee arthroplasty (Left, 2004 ); Hip Arthroplasty (Left, 6/8/16); and Cardiac pacemaker placement (Left, 09/2016). Restrictions  Restrictions/Precautions  Restrictions/Precautions: Up as Tolerated  Required Braces or Orthoses?: No  Implants present? : Metal implants, Pacemaker  Position Activity Restriction  Other position/activity restrictions: Up with assist  Vision/Hearing  Vision: Impaired  Vision Exceptions: Wears glasses at all times  Hearing: Within functional limits     Subjective  General  Patient assessed for rehabilitation services?: Yes  Family / Caregiver Present: No  Referring Practitioner: Khloe Gautam  Referral Date : 08/23/19  Diagnosis: bacterial UTI  Follows Commands: Within Functional Limits  Subjective  Subjective: Pt supine in bed and agreeable to therapy.   Pain Screening  Patient Currently in Pain: Yes  Pain Assessment  Pain Assessment: 0-10  Pain Type: Acute pain  Pain Location: Perineum  Pain Descriptors: Burning  Vital Signs  Patient Currently in Pain: Yes       Orientation  Orientation  Overall Orientation Status: Within Functional Limits  Social/Functional History  Social/Functional History  Lives With: Other (comment)  Type of Home: Facility([facility])  Home Layout: One level  Home Access: Level entry  Home Equipment: Rolling walker, Cane, [address] Help From: Personal care attendant  ADL Assistance: Needs assistance  Homemaking Assistance: Needs assistance  Homemaking Responsibilities: No  Ambulation Assistance: Independent(was using walker previously)  Transfer Assistance: Independent  Active : No  Additional Comments: pt reports history of falls; pt was receiving therapy prior to admission d/t balance deficits  Cognition   Cognition  Overall Cognitive Status: WFL    Objective          AROM RLE

## 2019-08-24 NOTE — ED NOTES
Page to [facility] for admission per [facility] request     Bisi Anaya, RADHA  08/23/19 2005

## 2019-08-24 NOTE — PROGRESS NOTES
Hypersomnia with sleep apnea, unspecified, Hypertension, Skin cancer, and Urinary frequency. has a past surgical history that includes colectomy (2006); Skin cancer excision; Coronary angioplasty with stent (9/11/2013, 2003?); eye surgery (Bilateral); Total knee arthroplasty (Bilateral, 2008 (R) 2004 (L)); Colonoscopy; Revision total knee arthroplasty (Left, 2004 ); Hip Arthroplasty (Left, 6/8/16); and Cardiac pacemaker placement (Left, 09/2016). Restrictions  Restrictions/Precautions  Restrictions/Precautions: Up as Tolerated  Required Braces or Orthoses?: No  Implants present? : Metal implants, Pacemaker  Position Activity Restriction  Other position/activity restrictions: Up with assist; hernia on the L side of abdomen    Subjective   General  Patient assessed for rehabilitation services?: Yes  Family / Caregiver Present: Yes(sons and other family)  Patient Currently in Pain: Denies(pt c/o pain only when urinating; notified earlier;see MAR)  Pain Assessment  Pain Assessment: 0-10  Pain Level: 9  Pain Location: Perineum (when urinating)  Oxygen Therapy  SpO2: 98 %  O2 Device: None (Room air)  Social/Functional History  Social/Functional History  Lives With: Other (comment)  Type of Home: Facility([facility])  Home Layout: One level  Home Access: Level entry, Elevator  Bathroom Shower/Tub: Walk-in shower, Shower chair with back  Bathroom Toilet: Standard  Home Equipment: Rolling walker, Nancy Abts, 4 wheeled walker  Receives Help From: Personal care attendant  ADL Assistance: Needs assistance  Bath: Moderate assistance  Dressing:  Moderate assistance  Toileting: Independent  Homemaking Assistance: Needs assistance  Homemaking Responsibilities: No  Ambulation Assistance: Independent  Transfer Assistance: Independent  Active : No  Mode of Transportation: Hoda Fernandes  Occupation: Retired  Type of occupation: [employer]   Leisure & Hobbies: watch TV, play cards   Additional

## 2019-08-24 NOTE — H&P
[facility]    HISTORY AND PHYSICAL EXAMINATION            Date:   8/24/2019  Patient name:  Ksenia Navarro  Date of admission:  8/23/2019  6:11 PM  MRN:   [id number]  Account:  [de-identified]  YOB: 1927  PCP:    August Mcneil (Inactive)  Room:   303/303-01  Code Status:    DNR-CC    Chief Complaint:     Chief Complaint   Patient presents with    Fatigue   Urinary frequency and dysuria    History Obtained From:     patient    History of Present Illness:     Ksenia Navarro is a 80 y.o. Non-/non  male who presents with Fatigue   and is admitted to the hospital for the management of Bacterial UTI. This is a 70-year-old white male who resides in extended care facility who presents with a complaint of fatigue, urinary frequency with dysuria. He had onset of urinary symptoms yesterday with frequency and burning sensation when he voids. He describes the burning sensation as being severe yesterday. He had increasing fatigue and he was sent to the emergency room from the extended care facility where he resides. He has been found to have urinary tract infection and is admitted for evaluation and antibiotic treatment. He denies any treatments prior to arrival.  He denies any fevers or chills, nausea or vomiting or other associated symptoms.     Past Medical History:     Past Medical History:   Diagnosis Date    Arthritis     Atrial fibrillation ([address]     CAD (coronary artery disease) 9/11/2013    2 stents placed    CHF (congestive heart failure) ([address]     dx in [facility] testing came back negative    Colon cancer ([address] 2006    s/p colectomy and colostomy     GERD (gastroesophageal reflux disease)     Heart attack ([address]     History of blood transfusion     autologus    History of ulcer disease     Hypercholesterolemia     Hypersomnia with sleep apnea, unspecified     uses CPAP nightly    Hypertension mouth every 4 hours as needed 6/11/16   Lorraine Yoon MD   docusate (COLACE, DULCOLAX) 100 MG CAPS Take 100 mg by mouth 2 times daily 6/11/16   Lorraine Yoon MD   albuterol (PROVENTIL HFA) 108 (90 BASE) MCG/ACT inhaler Inhale 1-2 puffs into the lungs every 4 hours as needed for Wheezing or Shortness of Breath (Space out to every 6 hours as symptoms improve). Space out to every 6 hours as symptoms improve. 10/27/14   Dunia Howell MD   Multiple Vitamin (MULTIVITAMIN PO) Take  by mouth daily. Historical Provider, MD        Allergies:     Eliquis [apixaban]; Pcn [penicillins]; Pollen extract; and Rivaroxaban    Social History:     Tobacco:    reports that he has quit smoking. He has a 150.00 pack-year smoking history. He has never used smokeless tobacco.  Alcohol:      reports that he drinks alcohol. Drug Use:  reports that he does not use drugs. Family History:     Family History   Problem Relation Age of Onset    Heart Disease Mother     Heart Disease Father     Cancer Brother     Heart Disease Sister        Review of Systems:     Positive and Negative as described in HPI.     CONSTITUTIONAL:  negative for fevers, chills, sweats, weight loss, positive fatigue  HEENT:  negative for vision, hearing changes, runny nose, throat pain  RESPIRATORY:  negative for shortness of breath, cough, congestion, wheezing  CARDIOVASCULAR:  negative for chest pain, palpitations  GASTROINTESTINAL:  negative for nausea, vomiting, diarrhea, constipation, change in bowel habits, abdominal pain   GENITOURINARY:  negative for difficulty of urination, positive for burning with urination, frequency   INTEGUMENT:  negative for rash, skin lesions, easy bruising   HEMATOLOGIC/LYMPHATIC:  negative for swelling/edema   ALLERGIC/IMMUNOLOGIC:  negative for urticaria , itching  ENDOCRINE:  negative increase in drinking, increase in urination, hot or cold intolerance  MUSCULOSKELETAL: Positive joint pains, negative for muscle Differential    Collection Time: 08/23/19  6:35 PM   Result Value Ref Range    WBC 15.0 (H) 3.5 - 11.0 k/uL    RBC 4.56 4.5 - 5.9 m/uL    Hemoglobin 15.0 13.5 - 17.5 g/dL    Hematocrit 43.2 41 - 53 %    MCV 94.8 80 - 100 fL    MCH 32.8 26 - 34 pg    MCHC 34.6 31 - 37 g/dL    RDW 13.6 12.5 - 15.4 %    Platelets 763 746 - 344 k/uL    MPV 8.2 6.0 - 12.0 fL    NRBC Automated NOT REPORTED per 100 WBC    Differential Type NOT REPORTED     Immature Granulocytes NOT REPORTED 0 %    Absolute Immature Granulocyte NOT REPORTED 0.00 - 0.30 k/uL    WBC Morphology NOT REPORTED     RBC Morphology NOT REPORTED     Platelet Estimate NOT REPORTED     Seg Neutrophils 90 (H) 36 - 66 %    Lymphocytes 3 (L) 24 - 44 %    Monocytes 7 1 - 7 %    Eosinophils % 0 (L) 1 - 4 %    Basophils 0 0 - 2 %    Segs Absolute 13.50 (H) 1.8 - 7.7 k/uL    Absolute Lymph # 0.45 (L) 1.0 - 4.8 k/uL    Absolute Mono # 1.05 (H) 0.1 - 0.8 k/uL    Absolute Eos # 0.00 0.0 - 0.4 k/uL    Basophils Absolute 0.00 0.0 - 0.2 k/uL    Morphology Normal    Basic Metabolic Panel    Collection Time: 08/23/19  6:35 PM   Result Value Ref Range    Glucose 125 (H) 70 - 99 mg/dL    BUN 43 (H) 8 - 23 mg/dL    CREATININE 1.42 (H) 0.70 - 1.20 mg/dL    Bun/Cre Ratio NOT REPORTED 9 - 20    Calcium 9.0 8.6 - 10.4 mg/dL    Sodium 136 135 - 144 mmol/L    Potassium 4.9 3.7 - 5.3 mmol/L    Chloride 99 98 - 107 mmol/L    CO2 20 20 - 31 mmol/L    Anion Gap 17 9 - 17 mmol/L    GFR Non-African American 47 (L) >60 mL/min    GFR  57 (L) >60 mL/min    GFR Comment          GFR Staging NOT REPORTED    Troponin    Collection Time: 08/23/19  6:35 PM   Result Value Ref Range    Troponin, High Sensitivity 68 (HH) 0 - 22 ng/L    Troponin T NOT REPORTED <0.03 ng/mL    Troponin Interp NOT REPORTED    Lactic Acid    Collection Time: 08/23/19  6:35 PM   Result Value Ref Range    Lactic Acid 1.8 0.5 - 2.2 mmol/L   Urinalysis    Collection Time: 08/23/19  7:14 PM   Result Value Ref Range Color, UA YELLOW YELLOW    Turbidity UA CLOUDY (A) CLEAR    Glucose, Ur NEGATIVE NEGATIVE    Bilirubin Urine NEGATIVE NEGATIVE    Ketones, Urine NEGATIVE NEGATIVE    Specific Gravity, UA 1.015 1.005 - 1.030    Urine Hgb LARGE (A) NEGATIVE    pH, UA 5.0 5.0 - 8.0    Protein, UA NEGATIVE NEGATIVE    Urobilinogen, Urine Normal Normal    Nitrite, Urine NEGATIVE NEGATIVE    Leukocyte Esterase, Urine SMALL (A) NEGATIVE    Urinalysis Comments NOT REPORTED    Microscopic Urinalysis    Collection Time: 08/23/19  7:14 PM   Result Value Ref Range    -          WBC, UA 20 TO 50 0 - 5 /HPF    RBC, UA 50  0 - 2 /HPF    Casts UA NOT REPORTED /LPF    Crystals UA NOT REPORTED None /HPF    Epithelial Cells UA 0 TO 2 0 - 5 /HPF    Renal Epithelial, Urine NOT REPORTED 0 /HPF    Bacteria, UA FEW (A) None    Mucus, UA NOT REPORTED None    Trichomonas, UA NOT REPORTED None    Amorphous, UA NOT REPORTED None    Other Observations UA NOT REPORTED NOT REQ.     Yeast, UA NOT REPORTED None   Troponin    Collection Time: 08/23/19  8:36 PM   Result Value Ref Range    Troponin, High Sensitivity 65 (HH) 0 - 22 ng/L    Troponin T NOT REPORTED <0.03 ng/mL    Troponin Interp NOT REPORTED    Potassium    Collection Time: 08/23/19  8:36 PM   Result Value Ref Range    Potassium 4.5 3.7 - 5.3 mmol/L   Basic Metabolic Panel w/ Reflex to MG    Collection Time: 08/24/19  6:05 AM   Result Value Ref Range    Glucose 103 (H) 70 - 99 mg/dL    BUN 40 (H) 8 - 23 mg/dL    CREATININE 1.64 (H) 0.70 - 1.20 mg/dL    Bun/Cre Ratio NOT REPORTED 9 - 20    Calcium 8.6 8.6 - 10.4 mg/dL    Sodium 136 135 - 144 mmol/L    Potassium 4.5 3.7 - 5.3 mmol/L    Chloride 101 98 - 107 mmol/L    CO2 24 20 - 31 mmol/L    Anion Gap 11 9 - 17 mmol/L    GFR Non-African American 39 (L) >60 mL/min    GFR  48 (L) >60 mL/min    GFR Comment          GFR Staging NOT REPORTED    CBC    Collection Time: 08/24/19  6:05 AM   Result Value Ref Range    WBC 14.5 (H) 3.5 - 11.0

## 2019-08-25 PROBLEM — R78.81 POSITIVE BLOOD CULTURE: Status: ACTIVE | Noted: 2019-01-01

## 2019-08-25 NOTE — PROGRESS NOTES
136  --  136   K 4.9 4.5 4.5   CL 99  --  101   CO2 20  --  24   GLUCOSE 125*  --  103*   BUN 43*  --  40*   CREATININE 1.42*  --  1.64*   ANIONGAP 17  --  11   LABGLOM 47*  --  39*   GFRAA 57*  --  48*   CALCIUM 9.0  --  8.6   TROPHS 68* 65*  --    No results for input(s): PROT, LABALBU, LABA1C, C2SMKRR, E6XZBOE, FT4, TSH, AST, ALT, LDH, GGT, ALKPHOS, LABGGT, BILITOT, BILIDIR, AMMONIA, AMYLASE, LIPASE, LACTATE, CHOL, HDL, LDLCHOLESTEROL, CHOLHDLRATIO, TRIG, VLDL, SWZ96IK, PHENYTOIN, PHENYF, URICACID, POCGLU in the last 72 hours. ABG:No results found for: POCPH, PHART, PH, POCPCO2, NZS6FJO, PCO2, POCPO2, PO2ART, PO2, POCHCO3, GHN5FOL, HCO3, NBEA, PBEA, BEART, BE, THGBART, THB, HRS8SRU, UKUF7MAN, V8IWMWMB, O2SAT, FIO2  Lab Results   Component Value Date/Time    SPECIAL 20ml left arm 08/23/2019 08:45 PM     Lab Results   Component Value Date/Time    CULTURE (A) 08/23/2019 08:45 PM     POSITIVE Blood Culture Results called to and read back by: MICHELLE GALICIA AT 1300 8/24/19    CULTURE DIRECT GRAM STAIN FROM BOTTLE: GRAM NEGATIVE RODS 08/23/2019 08:45 PM    CULTURE Klebsiella pneumoniae detected by PCR 08/23/2019 08:45 PM       Radiology:  Ct Abdomen Pelvis W Iv Contrast Additional Contrast? None    Result Date: 8/23/2019  1. Large left ventral parastomal hernia containing colonic and small bowel loops in association with left lower quadrant colostomy without evidence for small bowel obstruction. Prior left hemicolectomy. 2. Normal appendix. 3. Stable 3rd segment duodenal diverticulum measuring 4.0 x 2.1 cm. 4. Postsurgical changes and soft tissue thickening in the region of the rectum, similar to the prior study. 5. Left hip arthroplasty. Diffuse osteopenia. Moderate to severe degenerative changes in the spine. 6. No hydronephrosis or obstructing calculus. 7. Cholelithiasis. 8. Small left-sided pleural effusion with bibasilar atelectasis, left greater than right.      Xr Chest Portable    Result Date: 8/23/2019  Question of COPD with chronic lung changes. Mild atelectasis at the left lung base. Mild cardiomegaly. Physical Examination:       General appearance:  alert, cooperative and no distress  Mental Status:  oriented to person, place and time and normal affect  Lungs:  clear to auscultation bilaterally, normal effort  Heart:  regular rate and rhythm, no murmur  Abdomen:  soft, nontender, nondistended, normal bowel sounds, no masses, hepatomegaly, splenomegaly  Extremities:  no edema, redness, tenderness in the calves  Skin:  no gross lesions, rashes, induration    Assessment:     Hospital Problems           Last Modified POA    * (Principal) Bacterial UTI 8/24/2019 Yes    Coronary artery disease involving native coronary artery of native heart without angina pectoris (Chronic) 8/24/2019 Yes    Essential hypertension (Chronic) 8/24/2019 Yes    Chronic atrial fibrillation ([place]) 8/24/2019 Yes    Dizziness 8/24/2019 Yes    Chronic combined systolic and diastolic heart failure ([place]) 8/24/2019 Yes    Pulmonary HTN ([place]) 8/24/2019 Yes    Obstructive sleep apnea syndrome 8/24/2019 Yes    Parastomal hernia without obstruction or gangrene 8/24/2019 Yes    Positive blood culture 8/25/2019 Yes          Plan:     1. Continue IV antibiotics pending culture data  2. Monitoring control blood pressure  3. Continue present cardiac medications  4. GI and DVT prophylaxis  5. PT and OT  6. Echocardiogram pending progress culture results, has history of pacemaker and no suspicious infection may require echocardiogram to rule out endocarditis with bacteremia. 7. Check labs this morning  8.  Discharge planning pending culture results    Lauri Stone DO  8/25/2019  6:10 AM

## 2019-08-25 NOTE — FLOWSHEET NOTE
Pt resting in bed with son in law visiting at bedside. Up in the chair with the sera steady for most of the day. Alert and oriented. Seems SOB with any exertion. 02 saturation has remained above 94% on room air. Patient says the SOB is his not new. Takes albuterol treatments BID at home and had those reordered. Made Dr. Marely Ramírez aware and DC IVF per order. Will continue to monitor.

## 2019-08-25 NOTE — PLAN OF CARE
Problem: Falls - Risk of:  Goal: Will remain free from falls  Description  Will remain free from falls  8/25/2019 1620 by Collin Mckeon RN  Outcome: Ongoing  8/25/2019 0315 by Juan Morrison RN  Outcome: Ongoing  Patient is a fall risk during this admission. Fall risk assessment was performed. Patient is absent of falls. Bed is in the lowest position. Wheels on the bed are locked. Call light and bed side table are within reach. Clutter is removed. Patient was educated to call out when needing assistance or wanting to get out of bed. Patient offered toileting assistance during rounding. Hourly rounds have been performed. Goal: Absence of physical injury  Description  Absence of physical injury  8/25/2019 1620 by Collin Mckeon RN  Outcome: Ongoing  8/25/2019 0315 by Juan Morrison RN  Outcome: Ongoing  No falls or injuries this shift      Problem: Infection:  Goal: Will remain free from infection  Description  Will remain free from infection  8/25/2019 1620 by Collin Mckeon RN  Outcome: Ongoing  8/25/2019 0315 by Juan Morrison RN  Outcome: Ongoing   Pt remains afebrile, WBCs improved   Problem: Safety:  Goal: Free from accidental physical injury  Description  Free from accidental physical injury  8/25/2019 1620 by Collin Mckeon RN  Outcome: Ongoing  8/25/2019 0315 by Juan Morrison RN  Outcome: Ongoing  No falls or injuries this shift. Goal: Free from intentional harm  Description  Free from intentional harm  8/25/2019 1620 by Collin Mckeon RN  Outcome: Ongoing  8/25/2019 0315 by Juan Morrison RN  Outcome: Ongoing       Problem: Daily Care:  Goal: Daily care needs are met  Description  Daily care needs are met  8/25/2019 1620 by Collin Mckeon RN  Outcome: Ongoing  8/25/2019 0315 by Juan Morrison RN  Outcome: Ongoing  Pt assisted to complete ADLs. Needs assistance with bathing and toileting      Problem: Pain:  Goal: Patient's pain/discomfort is manageable  Description  Patient's pain/discomfort is manageable  8/25/2019 1620 by Víctor Schuler RN  Outcome: Ongoing  8/25/2019 0315 by Eunice Lagunas RN  Outcome: Ongoing   Pt denies any pain or discomfort this shift. Problem: Skin Integrity:  Goal: Skin integrity will stabilize  Description  Skin integrity will stabilize  8/25/2019 1620 by Víctor Schuler RN  Outcome: Ongoing  8/25/2019 0315 by Eunice Lagunas RN  Outcome: Ongoing  Skin assessment complete. Waffle mattress in place. Pt turned and repositioned every two hours with assistance from staff. Area kept free from moisture. Proper nourishment and fluids encouraged, as appropriate. Skin remains clean, dry, and intact. Will continue to monitor for additional needs and changes in skin breakdown. Mepilex placed on bilateral heels and coccyx. Problem: Risk for Impaired Skin Integrity  Goal: Tissue integrity - skin and mucous membranes  Description  Structural intactness and normal physiological function of skin and  mucous membranes. 8/25/2019 1620 by Víctor Schuler RN  Outcome: Ongoing  8/25/2019 0315 by Eunice Lagunas RN  Outcome: Ongoing       Problem: Sensory:  Goal: General experience of comfort will improve  Description  General experience of comfort will improve  8/25/2019 1620 by Víctor Schuler RN  Outcome: Ongoing  8/25/2019 0315 by Eunice Lagunas RN  Outcome: Ongoing     Problem: Urinary Elimination:  Goal: Signs and symptoms of infection will decrease  Description  Signs and symptoms of infection will decrease  8/25/2019 1620 by Víctor Schuler RN  Outcome: Ongoing  8/25/2019 0315 by Eunice Lagunas RN  Outcome: Ongoing  Pt remains on pyridium- denies any discomfort when urinating.    Goal: Ability to reestablish a normal urinary elimination pattern will improve - after catheter removal  Description  Ability to reestablish a normal urinary elimination pattern will improve  8/25/2019 1620 by Víctor Schuler RN  Outcome: Ongoing  8/25/2019 0315 by Eunice Lagunas RN  Outcome:

## 2019-08-26 PROBLEM — N17.9 AKI (ACUTE KIDNEY INJURY) (HCC): Status: ACTIVE | Noted: 2019-01-01

## 2019-08-26 PROBLEM — L89.90 PRESSURE ULCER: Status: ACTIVE | Noted: 2019-01-01

## 2019-08-26 NOTE — PROGRESS NOTES
abdominal pain, constipation, nausea and vomiting. Genitourinary: Positive for dysuria. Negative for urgency. Musculoskeletal: Positive for back pain and gait problem. Left mid back  Needs 1 assist & walker for standing & transfers   Skin: Positive for wound. Negative for rash. Reports hx of skin cancer   Neurological: Negative for dizziness, tremors, numbness and headaches. Psychiatric/Behavioral: Negative. Medications: Allergies: Allergies   Allergen Reactions    Eliquis [Apixaban] Other (See Comments)     feels funny     Pcn [Penicillins]     Pollen Extract     Rivaroxaban Other (See Comments)       Current Meds:   Scheduled Meds:    cefTRIAXone (ROCEPHIN) IV  1 g Intravenous Q24H    enoxaparin  30 mg Subcutaneous Daily    phenazopyridine  200 mg Oral TID WC    clopidogrel  75 mg Oral Daily    docusate sodium  100 mg Oral BID    [Held by provider] furosemide  40 mg Oral Daily    levothyroxine  25 mcg Oral Daily    pantoprazole  40 mg Oral QAM AC    PARoxetine  10 mg Oral Daily    simvastatin  40 mg Oral Nightly    sodium chloride flush  10 mL Intravenous 2 times per day     Continuous Infusions:    sodium chloride       PRN Meds: albuterol, sodium chloride flush, acetaminophen, sodium chloride flush, magnesium hydroxide, ondansetron, nicotine    Data:     Past Medical History:   has a past medical history of Arthritis, Atrial fibrillation ([facility] CAD (coronary artery disease), CHF (congestive heart failure) ([facility] cancer ([facility] GERD (gastroesophageal reflux disease), Heart attack ([facility] History of blood transfusion, History of ulcer disease, Hypercholesterolemia, Hypersomnia with sleep apnea, unspecified, Hypertension, Skin cancer, and Urinary frequency. Social History:   reports that he has quit smoking. He has a 150.00 pack-year smoking history. He has never used smokeless tobacco. He reports that he drinks alcohol. He reports that he does not use drugs.

## 2019-08-26 NOTE — PLAN OF CARE
Patient is a fall risk during this shift. Fall risk assessment was performed. Patient is absent of falls. Bed is in the lowest position. Wheels on the bed are locked. Call light and bed side table are within reach. Clutter is removed. Patient was educated to call out when needing assistance or wanting to get out of bed. Patient offered toileting assistance during rounding. Hourly rounds continue. Skin integrity intact. Mucous membranes pink, moist and intact. Patient turned every two hours. Skin and pressure ulcer assessment performed. Patient will report pain using the 0-10 scale. Pain medication ordered will be administered as needed for pain in a timely manor. Non-pharmaceutical interventions provided and refused at this time.   Will continue to monitor

## 2019-08-26 NOTE — PROGRESS NOTES
mobility/transfers at SBA using good safety awareness   Short term goal 4: demonstrate grooming tasks standing sinkside at SBA using energy conservation strategies  Short term goal 5: demonstrate ~25 minutes of functional activity tolerance to engage in ADL tasks        Therapy Time   Individual Concurrent Group Co-treatment   Time In 0815      Co-tx w/Pt for pt safety    Time Out 0911         Minutes 56         Timed Code Treatment Minutes: [address], OTS

## 2019-08-26 NOTE — CARE COORDINATION
Reviewed chart and met with pt. Pt lives in an independent living at [facility]. They provide housekeeping and laundry services. Pt stated he currently has therapy coming to his apartment regularly to provide PT services. His daughter Cher Escamilla visit daily to assist with med set up and arrange other medical needs. Pt would like to return home upon discharge. Pt requested writer to contact her to review pt needs. There was no IMM in place; contacted Thai Rowan in registration. They will follow up with pt.

## 2019-08-27 PROBLEM — R78.81 BACTEREMIA: Status: ACTIVE | Noted: 2019-01-01

## 2019-08-27 PROBLEM — R78.81 POSITIVE BLOOD CULTURE: Status: RESOLVED | Noted: 2019-01-01 | Resolved: 2019-01-01

## 2019-08-27 NOTE — PROGRESS NOTES
AM-PAC Score             Goals  Short term goals  Time Frame for Short term goals: 14 visits   Short term goal 1: Pt to improve standing balance with RW to good- to assist with pt safety with ambulation. Short term goal 2: Pt to improve bed mobility to SBA to assist with return to prior level of function. Short term goal 3: Pt to complete sit<>stand transfer SBA with walker without LOB to assist with return to prior level of function.- goal met. Short term goal 4: Pt to ambulate SBA ' without LOB for functional ambulation. Patient Goals   Patient goals : return to home    Plan    Plan  Times per week: 5-6  Times per day: Daily  Current Treatment Recommendations: Strengthening, Transfer Training, Endurance Training, Balance Training, Gait Training, Safety Education & Training, Functional Mobility Training  Plan Comment: Pt plans to return to Independent living facility with services. Safety Devices  Type of devices:  All fall risk precautions in place, Gait belt, Left in chair, Chair alarm in place, Call light within reach, Patient at risk for falls, Nurse notified  Restraints  Initially in place: No     Therapy Time   Individual Concurrent Group Co-treatment   Time In 1012         Time Out 1105         Minutes 53         Timed Code Treatment Minutes: [address] PT

## 2019-08-27 NOTE — PLAN OF CARE
Problem: Falls - Risk of:  Goal: Will remain free from falls  Description  Will remain free from falls  Outcome: Ongoing  Goal: Absence of physical injury  Description  Absence of physical injury  Outcome: Ongoing   Patient is a fall risk during this shift. Fall risk assessment was performed. Patient is absent of falls. Bed is in the lowest position. Wheels on the bed are locked. Call light and bed side table are within reach. Clutter is removed. Patient was educated to call out when needing assistance or wanting to get out of bed. Patient offered toileting assistance during rounding. Hourly rounds continue. Problem: Infection:  Goal: Will remain free from infection  Description  Will remain free from infection  Outcome: Ongoing   Patient remains on IV ATB, patient able to verbalize that he is feeling better and feels he is improving every day, less burning, less pressure    Problem: Safety:  Goal: Free from accidental physical injury  Description  Free from accidental physical injury  Outcome: Ongoing  Goal: Free from intentional harm  Description  Free from intentional harm  Outcome: Ongoing   Patient is a fall risk during this shift. Fall risk assessment was performed. Patient is absent of falls. Bed is in the lowest position. Wheels on the bed are locked. Call light and bed side table are within reach. Clutter is removed. Patient was educated to call out when needing assistance or wanting to get out of bed. Patient offered toileting assistance during rounding. Hourly rounds continue. Problem: Daily Care:  Goal: Daily care needs are met  Description  Daily care needs are met  Outcome: Ongoing     Problem: Pain:  Description  Pain management should include both nonpharmacologic and pharmacologic interventions.   Goal: Patient's pain/discomfort is manageable  Description  Patient's pain/discomfort is manageable  Outcome: Ongoing  Goal: Pain level will decrease  Description  Pain level will

## 2019-08-27 NOTE — DISCHARGE SUMMARY
to urology is recommended- patient does not currently follow with a urologist.     He is being discharged on a decreased Lasix dose and a 7 day course of PO Cipro. He lives alone in a senior apartment, and there was concern for him to be discharged home as he has been deconditioned 2/2 this UTI and hospital stay. There is also concern for his renal function as well as pressure area to his perineum that requires monitoring and daily wound cleansing and Mepilex dressing change. Therefore, arrangements were made for him to be discharged to [facility] for rehab for strengthening and nursing services.        Significant therapeutic interventions: Gentle IV hydration  Monitoring renal function  Diuretics held  PT/OT  Mepilex to reddened perineum  Routine ostomy care  Pressure relief to mattress and chair to avoid further skin breakdown      Significant Diagnostic Studies:   Labs / Micro:  CBC:   Lab Results   Component Value Date    WBC 6.1 08/27/2019    RBC 4.28 08/27/2019    HGB 14.3 08/27/2019    HCT 40.5 08/27/2019    MCV 94.8 08/27/2019    MCH 33.3 08/27/2019    MCHC 35.2 08/27/2019    RDW 13.7 08/27/2019     08/27/2019     BMP:    Lab Results   Component Value Date    GLUCOSE 90 08/27/2019     08/27/2019    K 3.9 08/27/2019     08/27/2019    CO2 22 08/27/2019    ANIONGAP 13 08/27/2019    BUN 31 08/27/2019    CREATININE 1.38 08/27/2019    BUNCRER NOT REPORTED 08/27/2019    CALCIUM 8.8 08/27/2019    LABGLOM 48 08/27/2019    GFRAA 58 08/27/2019    GFR      08/27/2019    GFR NOT REPORTED 08/27/2019     U/A:    Lab Results   Component Value Date    COLORU YELLOW 08/23/2019    TURBIDITY CLOUDY 08/23/2019    SPECGRAV 1.015 08/23/2019    HGBUR LARGE 08/23/2019    PHUR 5.0 08/23/2019    PROTEINU NEGATIVE 08/23/2019    GLUCOSEU NEGATIVE 08/23/2019    KETUA NEGATIVE 08/23/2019    BILIRUBINUR NEGATIVE 08/23/2019    UROBILINOGEN Normal 08/23/2019    NITRU NEGATIVE 08/23/2019    LEUKOCYTESUR

## 2019-08-27 NOTE — PROGRESS NOTES
elevated- suspect prostatitis Await urine culture results. BC grew klebsiella aerogenes. Will discont Rocephin and start Cipro per sensitivity results. Active Problems:    Coronary artery disease involving native coronary artery of native heart without angina pectoris  Plan: Continue clopidigrel    Essential hypertension  Plan: Monitor BP per unit routine    Chronic atrial fibrillation (HCC)  Plan: Continue clopidigrel    Chronic combined systolic and diastolic heart failure (HCC)  Plan: Monitor BMP & resume Lasix once creat back to baseline    Pulmonary HTN ([facility]  Plan: See above    Obstructive sleep apnea syndrome  Plan: Patient has not worn CPAP for over a year- does not tolerate the mask    Parastomal hernia without obstruction or gangrene  Plan: Monitor for abd pain, monitor for s/sx infection, Colace daily    Positive blood culture  Plan: BC growing enterobacter aerogenes, noted to be sensitive to Ciprofloxacin. Will discontinue Rocephin and start Cipro 500 mg PO 2x/day for 7 days. Repeat UA after completion of Cipro course. PSA noted to be elevated- suspect prostatitis- recommend follow-up w PCP after completion of Cipro course. Pressure ulcer  Plan: Daily cleansing w soap and water, change Mepilex daily, off load weight to area as able, waffle mattress to bed and chair, frequent repositioning while in bed/chair    RAGHAV (acute kidney injury) ([facility]  Plan: Creat improving. Cont IV hydration for now. Cont to hold Lasix. Plan for discharge to [facility] today or tomorrow. Patient and patient's family agreeable to plan.          ROBERT Ash NP  8/27/2019  11:55 AM

## 2019-08-27 NOTE — DISCHARGE INSTR - COC
Assisted  Toileting  Assisted  Feeding  Independent  Med Admin  Independent  Med Delivery   whole    Wound Care Documentation and Therapy:  Wound 08/23/19 Coccyx Posterior 2 cm open pink wound with clean edges non draining, duodem applied (Active)   Wound Pressure Stage  2 8/26/2019  8:00 AM   Dressing Status Clean;Dry; Intact 8/27/2019  2:35 PM   Dressing Changed Changed/New 8/25/2019  3:04 AM   Dressing/Treatment Foam;Moisture barrier 8/27/2019  2:35 PM   Number of days: 3        Elimination:  Continence:   · Bowel: No  · Bladder: Yes  Urinary Catheter: None   Colostomy/Ileostomy/Ileal Conduit: Yes  Colostomy LLQ-Stomal Appliance: 2 piece  Colostomy LLQ-Stoma  Assessment: Unable to assess  Colostomy LLQ-Peristomal Assessment: Unable to assess  Colostomy LLQ-Stool Appearance: Soft  Colostomy LLQ-Stool Color: Brown  Colostomy LLQ-Stool Amount: Medium    Date of Last BM: August 27, 2019    Intake/Output Summary (Last 24 hours) at 8/27/2019 Jojo Katag  Last data filed at 8/27/2019 1014  Gross per 24 hour   Intake 550 ml   Output 1275 ml   Net -725 ml     I/O last 3 completed shifts: In: 846.7 [P.O.:550; I.V.:296.7]  Out: 1675 [Urine:1675]    Safety Concerns: At Risk for Falls    Impairments/Disabilities:      None    Nutrition Therapy:  Current Nutrition Therapy:   - Oral Diet:  Carb Control 4 carbs/meal (1800kcals/day)    Routes of Feeding: Oral  Liquids: Thin  Daily Fluid Restriction: no  Last Modified Barium Swallow with Video (Video Swallowing Test): not done    Treatments at the Time of Hospital Discharge:   Respiratory Treatments: None  Oxygen Therapy:  is not on home oxygen therapy.   Ventilator:    - No ventilator support    Rehab Therapies: Physical Therapy, Occupational Therapy and skilled nursing for wound care, medication administration  Weight Bearing Status/Restrictions: No weight bearing restirctions  Other Medical Equipment (for information only, NOT a DME order):  walker  Other Treatments: Mepilex to DO on 8/27/19 at 6:38 PM

## 2019-08-29 NOTE — CARE COORDINATION
Copy of CMS letter faxed to [facility] informing pt is part of BPCI-A program (dx [phone]- UTI).

## 2019-09-25 PROBLEM — I50.9 CHF WITH UNKNOWN LVEF (HCC): Status: ACTIVE | Noted: 2019-01-01

## 2019-09-27 PROBLEM — N39.0 UTI DUE TO KLEBSIELLA SPECIES: Status: ACTIVE | Noted: 2019-01-01

## 2019-09-27 PROBLEM — B96.89 UTI DUE TO KLEBSIELLA SPECIES: Status: ACTIVE | Noted: 2019-01-01

## 2019-09-27 PROBLEM — A49.02 MRSA INFECTION: Status: ACTIVE | Noted: 2019-01-01

## 2020-01-01 ENCOUNTER — APPOINTMENT (OUTPATIENT)
Dept: GENERAL RADIOLOGY | Age: 85
DRG: 291 | End: 2020-01-01
Payer: MEDICARE

## 2020-01-01 ENCOUNTER — HOSPITAL ENCOUNTER (EMERGENCY)
Age: 85
Discharge: HOME | End: 2020-01-31
Attending: EMERGENCY MEDICINE
Payer: MEDICARE

## 2020-01-01 ENCOUNTER — HOSPITAL ENCOUNTER (INPATIENT)
Age: 85
LOS: 3 days | Discharge: NURSING, REHAB OR LONG-TERM CARE | DRG: 291 | End: 2020-03-02
Attending: EMERGENCY MEDICINE | Admitting: INTERNAL MEDICINE
Payer: MEDICARE

## 2020-01-01 ENCOUNTER — APPOINTMENT (OUTPATIENT)
Dept: CT IMAGING | Age: 85
DRG: 291 | End: 2020-01-01
Payer: MEDICARE

## 2020-01-01 VITALS
HEIGHT: 71 IN | BODY MASS INDEX: 24.64 KG/M2 | DIASTOLIC BLOOD PRESSURE: 71 MMHG | HEART RATE: 75 BPM | RESPIRATION RATE: 18 BRPM | SYSTOLIC BLOOD PRESSURE: 112 MMHG | TEMPERATURE: 98.6 F | OXYGEN SATURATION: 98 % | WEIGHT: 176 LBS

## 2020-01-01 VITALS
OXYGEN SATURATION: 96 % | HEART RATE: 86 BPM | DIASTOLIC BLOOD PRESSURE: 87 MMHG | RESPIRATION RATE: 18 BRPM | SYSTOLIC BLOOD PRESSURE: 122 MMHG | TEMPERATURE: 98.4 F

## 2020-01-01 LAB
-: NORMAL
ABSOLUTE EOS #: 0 K/UL (ref 0–0.4)
ABSOLUTE IMMATURE GRANULOCYTE: ABNORMAL K/UL (ref 0–0.3)
ABSOLUTE LYMPH #: 0.5 K/UL (ref 1–4.8)
ABSOLUTE MONO #: 0.6 K/UL (ref 0.1–1.2)
ALBUMIN SERPL-MCNC: 3.5 G/DL (ref 3.5–5.2)
ALBUMIN SERPL-MCNC: 3.7 G/DL (ref 3.5–5.2)
ALBUMIN/GLOBULIN RATIO: 1.3 (ref 1–2.5)
ALBUMIN/GLOBULIN RATIO: 1.5 (ref 1–2.5)
ALP BLD-CCNC: 62 U/L (ref 40–129)
ALP BLD-CCNC: 66 U/L (ref 40–129)
ALT SERPL-CCNC: 11 U/L (ref 5–41)
ALT SERPL-CCNC: 12 U/L (ref 5–41)
ANION GAP SERPL CALCULATED.3IONS-SCNC: 13 MMOL/L (ref 9–17)
ANION GAP SERPL CALCULATED.3IONS-SCNC: 15 MMOL/L (ref 9–17)
ANION GAP SERPL CALCULATED.3IONS-SCNC: 16 MMOL/L (ref 9–17)
ANION GAP SERPL CALCULATED.3IONS-SCNC: 17 MMOL/L (ref 9–17)
AST SERPL-CCNC: 18 U/L
AST SERPL-CCNC: 20 U/L
BASOPHILS # BLD: 1 % (ref 0–2)
BASOPHILS ABSOLUTE: 0 K/UL (ref 0–0.2)
BILIRUB SERPL-MCNC: 1.09 MG/DL (ref 0.3–1.2)
BILIRUB SERPL-MCNC: 1.34 MG/DL (ref 0.3–1.2)
BILIRUBIN DIRECT: 0.39 MG/DL
BILIRUBIN DIRECT: 0.5 MG/DL
BILIRUBIN URINE: NEGATIVE
BILIRUBIN, INDIRECT: 0.7 MG/DL (ref 0–1)
BILIRUBIN, INDIRECT: 0.84 MG/DL (ref 0–1)
BNP INTERPRETATION: ABNORMAL
BUN BLDV-MCNC: 36 MG/DL (ref 8–23)
BUN BLDV-MCNC: 38 MG/DL (ref 8–23)
BUN BLDV-MCNC: 48 MG/DL (ref 8–23)
BUN BLDV-MCNC: 53 MG/DL (ref 8–23)
BUN/CREAT BLD: ABNORMAL (ref 9–20)
CALCIUM SERPL-MCNC: 8.8 MG/DL (ref 8.6–10.4)
CALCIUM SERPL-MCNC: 8.9 MG/DL (ref 8.6–10.4)
CALCIUM SERPL-MCNC: 8.9 MG/DL (ref 8.6–10.4)
CALCIUM SERPL-MCNC: 9 MG/DL (ref 8.6–10.4)
CHLORIDE BLD-SCNC: 102 MMOL/L (ref 98–107)
CHLORIDE BLD-SCNC: 103 MMOL/L (ref 98–107)
CHLORIDE BLD-SCNC: 103 MMOL/L (ref 98–107)
CHLORIDE BLD-SCNC: 105 MMOL/L (ref 98–107)
CHOLESTEROL/HDL RATIO: 2.9
CHOLESTEROL: 89 MG/DL
CK MB: 2.8 NG/ML
CO2: 19 MMOL/L (ref 20–31)
CO2: 20 MMOL/L (ref 20–31)
CO2: 21 MMOL/L (ref 20–31)
CO2: 24 MMOL/L (ref 20–31)
COLOR: YELLOW
COMMENT UA: NORMAL
CREAT SERPL-MCNC: 1.32 MG/DL (ref 0.7–1.2)
CREAT SERPL-MCNC: 1.38 MG/DL (ref 0.7–1.2)
CREAT SERPL-MCNC: 1.42 MG/DL (ref 0.7–1.2)
CREAT SERPL-MCNC: 1.42 MG/DL (ref 0.7–1.2)
D-DIMER QUANTITATIVE: 0.92 MG/L FEU
DIFFERENTIAL TYPE: ABNORMAL
DIRECT EXAM: NORMAL
EKG ATRIAL RATE: 416 BPM
EKG Q-T INTERVAL: 504 MS
EKG QRS DURATION: 188 MS
EKG QTC CALCULATION (BAZETT): 551 MS
EKG R AXIS: -71 DEGREES
EKG T AXIS: 95 DEGREES
EKG VENTRICULAR RATE: 72 BPM
EOSINOPHILS RELATIVE PERCENT: 1 % (ref 1–4)
GFR AFRICAN AMERICAN: 56 ML/MIN
GFR AFRICAN AMERICAN: 56 ML/MIN
GFR AFRICAN AMERICAN: 58 ML/MIN
GFR AFRICAN AMERICAN: >60 ML/MIN
GFR NON-AFRICAN AMERICAN: 47 ML/MIN
GFR NON-AFRICAN AMERICAN: 47 ML/MIN
GFR NON-AFRICAN AMERICAN: 48 ML/MIN
GFR NON-AFRICAN AMERICAN: 51 ML/MIN
GFR SERPL CREATININE-BSD FRML MDRD: ABNORMAL ML/MIN/{1.73_M2}
GLOBULIN: ABNORMAL G/DL (ref 1.5–3.8)
GLOBULIN: ABNORMAL G/DL (ref 1.5–3.8)
GLUCOSE BLD-MCNC: 108 MG/DL (ref 70–99)
GLUCOSE BLD-MCNC: 117 MG/DL (ref 75–110)
GLUCOSE BLD-MCNC: 121 MG/DL (ref 75–110)
GLUCOSE BLD-MCNC: 123 MG/DL (ref 75–110)
GLUCOSE BLD-MCNC: 124 MG/DL (ref 70–99)
GLUCOSE BLD-MCNC: 124 MG/DL (ref 75–110)
GLUCOSE BLD-MCNC: 137 MG/DL (ref 75–110)
GLUCOSE BLD-MCNC: 142 MG/DL (ref 75–110)
GLUCOSE BLD-MCNC: 143 MG/DL (ref 70–99)
GLUCOSE BLD-MCNC: 153 MG/DL (ref 75–110)
GLUCOSE BLD-MCNC: 154 MG/DL (ref 75–110)
GLUCOSE BLD-MCNC: 155 MG/DL (ref 75–110)
GLUCOSE BLD-MCNC: 163 MG/DL (ref 70–99)
GLUCOSE BLD-MCNC: 165 MG/DL (ref 75–110)
GLUCOSE BLD-MCNC: 213 MG/DL (ref 75–110)
GLUCOSE URINE: NEGATIVE
HCT VFR BLD CALC: 36.1 % (ref 41–53)
HCT VFR BLD CALC: 37.7 % (ref 41–53)
HCT VFR BLD CALC: 39.4 % (ref 41–53)
HDLC SERPL-MCNC: 31 MG/DL
HEMOGLOBIN: 12.3 G/DL (ref 13.5–17.5)
HEMOGLOBIN: 12.6 G/DL (ref 13.5–17.5)
HEMOGLOBIN: 13.1 G/DL (ref 13.5–17.5)
IMMATURE GRANULOCYTES: ABNORMAL %
INR BLD: 1.1
KETONES, URINE: NEGATIVE
LDL CHOLESTEROL: 43 MG/DL (ref 0–130)
LEUKOCYTE ESTERASE, URINE: NEGATIVE
LYMPHOCYTES # BLD: 9 % (ref 24–44)
Lab: NORMAL
MAGNESIUM: 1.7 MG/DL (ref 1.6–2.6)
MAGNESIUM: 1.8 MG/DL (ref 1.6–2.6)
MCH RBC QN AUTO: 31.3 PG (ref 26–34)
MCH RBC QN AUTO: 31.6 PG (ref 26–34)
MCH RBC QN AUTO: 31.9 PG (ref 26–34)
MCHC RBC AUTO-ENTMCNC: 33.2 G/DL (ref 31–37)
MCHC RBC AUTO-ENTMCNC: 33.4 G/DL (ref 31–37)
MCHC RBC AUTO-ENTMCNC: 34.1 G/DL (ref 31–37)
MCV RBC AUTO: 93.8 FL (ref 80–100)
MCV RBC AUTO: 94.3 FL (ref 80–100)
MCV RBC AUTO: 94.6 FL (ref 80–100)
MONOCYTES # BLD: 10 % (ref 2–11)
MYOGLOBIN: 101 NG/ML (ref 28–72)
MYOGLOBIN: 209 NG/ML (ref 28–72)
MYOGLOBIN: 297 NG/ML (ref 28–72)
MYOGLOBIN: 347 NG/ML (ref 28–72)
MYOGLOBIN: 375 NG/ML (ref 28–72)
MYOGLOBIN: 82 NG/ML (ref 28–72)
NITRITE, URINE: NEGATIVE
NRBC AUTOMATED: ABNORMAL PER 100 WBC
PARTIAL THROMBOPLASTIN TIME: 26.7 SEC (ref 21.3–31.3)
PDW BLD-RTO: 14.1 % (ref 12.5–15.4)
PDW BLD-RTO: 14.4 % (ref 12.5–15.4)
PDW BLD-RTO: 14.6 % (ref 12.5–15.4)
PH UA: 5 (ref 5–8)
PLATELET # BLD: 158 K/UL (ref 140–450)
PLATELET # BLD: 175 K/UL (ref 140–450)
PLATELET # BLD: 208 K/UL (ref 140–450)
PLATELET ESTIMATE: ABNORMAL
PMV BLD AUTO: 10.2 FL (ref 8–14)
PMV BLD AUTO: 7.8 FL (ref 6–12)
PMV BLD AUTO: 8.5 FL (ref 6–12)
POTASSIUM SERPL-SCNC: 3.6 MMOL/L (ref 3.7–5.3)
POTASSIUM SERPL-SCNC: 3.8 MMOL/L (ref 3.7–5.3)
POTASSIUM SERPL-SCNC: 3.8 MMOL/L (ref 3.7–5.3)
POTASSIUM SERPL-SCNC: 3.9 MMOL/L (ref 3.7–5.3)
PRO-BNP: ABNORMAL PG/ML
PROTEIN UA: NEGATIVE
PROTHROMBIN TIME: 11.1 SEC (ref 9.4–12.6)
RBC # BLD: 3.85 M/UL (ref 4.5–5.9)
RBC # BLD: 3.98 M/UL (ref 4.5–5.9)
RBC # BLD: 4.18 M/UL (ref 4.5–5.9)
RBC # BLD: ABNORMAL 10*6/UL
REASON FOR REJECTION: NORMAL
SEG NEUTROPHILS: 79 % (ref 36–66)
SEGMENTED NEUTROPHILS ABSOLUTE COUNT: 4.6 K/UL (ref 1.8–7.7)
SODIUM BLD-SCNC: 138 MMOL/L (ref 135–144)
SODIUM BLD-SCNC: 139 MMOL/L (ref 135–144)
SODIUM BLD-SCNC: 140 MMOL/L (ref 135–144)
SODIUM BLD-SCNC: 141 MMOL/L (ref 135–144)
SPECIFIC GRAVITY UA: 1.02 (ref 1–1.03)
SPECIMEN DESCRIPTION: NORMAL
TOTAL CK: 40 U/L (ref 39–308)
TOTAL PROTEIN: 6.1 G/DL (ref 6.4–8.3)
TOTAL PROTEIN: 6.2 G/DL (ref 6.4–8.3)
TRIGL SERPL-MCNC: 76 MG/DL
TROPONIN INTERP: ABNORMAL
TROPONIN T: ABNORMAL NG/ML
TROPONIN, HIGH SENSITIVITY: 67 NG/L (ref 0–22)
TROPONIN, HIGH SENSITIVITY: 69 NG/L (ref 0–22)
TROPONIN, HIGH SENSITIVITY: 70 NG/L (ref 0–22)
TROPONIN, HIGH SENSITIVITY: 70 NG/L (ref 0–22)
TROPONIN, HIGH SENSITIVITY: 71 NG/L (ref 0–22)
TROPONIN, HIGH SENSITIVITY: 73 NG/L (ref 0–22)
TROPONIN, HIGH SENSITIVITY: 82 NG/L (ref 0–22)
TROPONIN, HIGH SENSITIVITY: 87 NG/L (ref 0–22)
TROPONIN, HIGH SENSITIVITY: 88 NG/L (ref 0–22)
TSH SERPL DL<=0.05 MIU/L-ACNC: 1.46 MIU/L (ref 0.3–5)
TURBIDITY: CLEAR
URINE HGB: NEGATIVE
UROBILINOGEN, URINE: NORMAL
VLDLC SERPL CALC-MCNC: ABNORMAL MG/DL (ref 1–30)
WBC # BLD: 10 K/UL (ref 3.5–11)
WBC # BLD: 5.3 K/UL (ref 3.5–11)
WBC # BLD: 5.8 K/UL (ref 3.5–11)
WBC # BLD: ABNORMAL 10*3/UL
ZZ NTE CLEAN UP: ORDERED TEST: NORMAL
ZZ NTE WITH NAME CLEAN UP: SPECIMEN SOURCE: NORMAL

## 2020-01-01 PROCEDURE — APPSS30 APP SPLIT SHARED TIME 16-30 MINUTES: Performed by: NURSE PRACTITIONER

## 2020-01-01 PROCEDURE — 96375 TX/PRO/DX INJ NEW DRUG ADDON: CPT

## 2020-01-01 PROCEDURE — 84443 ASSAY THYROID STIM HORMONE: CPT

## 2020-01-01 PROCEDURE — 83874 ASSAY OF MYOGLOBIN: CPT

## 2020-01-01 PROCEDURE — 6360000002 HC RX W HCPCS: Performed by: NURSE PRACTITIONER

## 2020-01-01 PROCEDURE — 2700000000 HC OXYGEN THERAPY PER DAY

## 2020-01-01 PROCEDURE — 83880 ASSAY OF NATRIURETIC PEPTIDE: CPT

## 2020-01-01 PROCEDURE — 80048 BASIC METABOLIC PNL TOTAL CA: CPT

## 2020-01-01 PROCEDURE — 94761 N-INVAS EAR/PLS OXIMETRY MLT: CPT

## 2020-01-01 PROCEDURE — 87804 INFLUENZA ASSAY W/OPTIC: CPT

## 2020-01-01 PROCEDURE — 99233 SBSQ HOSP IP/OBS HIGH 50: CPT | Performed by: INTERNAL MEDICINE

## 2020-01-01 PROCEDURE — 6370000000 HC RX 637 (ALT 250 FOR IP): Performed by: NURSE PRACTITIONER

## 2020-01-01 PROCEDURE — 1210000000 HC MED SURG R&B

## 2020-01-01 PROCEDURE — 83735 ASSAY OF MAGNESIUM: CPT

## 2020-01-01 PROCEDURE — 85730 THROMBOPLASTIN TIME PARTIAL: CPT

## 2020-01-01 PROCEDURE — 36415 COLL VENOUS BLD VENIPUNCTURE: CPT

## 2020-01-01 PROCEDURE — 71045 X-RAY EXAM CHEST 1 VIEW: CPT

## 2020-01-01 PROCEDURE — 99239 HOSP IP/OBS DSCHRG MGMT >30: CPT | Performed by: INTERNAL MEDICINE

## 2020-01-01 PROCEDURE — 84484 ASSAY OF TROPONIN QUANT: CPT

## 2020-01-01 PROCEDURE — 82553 CREATINE MB FRACTION: CPT

## 2020-01-01 PROCEDURE — 97166 OT EVAL MOD COMPLEX 45 MIN: CPT

## 2020-01-01 PROCEDURE — 82947 ASSAY GLUCOSE BLOOD QUANT: CPT

## 2020-01-01 PROCEDURE — 82550 ASSAY OF CK (CPK): CPT

## 2020-01-01 PROCEDURE — APPSS45 APP SPLIT SHARED TIME 31-45 MINUTES: Performed by: NURSE PRACTITIONER

## 2020-01-01 PROCEDURE — 97530 THERAPEUTIC ACTIVITIES: CPT

## 2020-01-01 PROCEDURE — 99283 EMERGENCY DEPT VISIT LOW MDM: CPT

## 2020-01-01 PROCEDURE — 80076 HEPATIC FUNCTION PANEL: CPT

## 2020-01-01 PROCEDURE — 2580000003 HC RX 258: Performed by: NURSE PRACTITIONER

## 2020-01-01 PROCEDURE — 99223 1ST HOSP IP/OBS HIGH 75: CPT | Performed by: NURSE PRACTITIONER

## 2020-01-01 PROCEDURE — 97535 SELF CARE MNGMENT TRAINING: CPT

## 2020-01-01 PROCEDURE — 85610 PROTHROMBIN TIME: CPT

## 2020-01-01 PROCEDURE — 93005 ELECTROCARDIOGRAM TRACING: CPT | Performed by: EMERGENCY MEDICINE

## 2020-01-01 PROCEDURE — 96374 THER/PROPH/DIAG INJ IV PUSH: CPT

## 2020-01-01 PROCEDURE — 85027 COMPLETE CBC AUTOMATED: CPT

## 2020-01-01 PROCEDURE — 94640 AIRWAY INHALATION TREATMENT: CPT

## 2020-01-01 PROCEDURE — 99285 EMERGENCY DEPT VISIT HI MDM: CPT

## 2020-01-01 PROCEDURE — 97116 GAIT TRAINING THERAPY: CPT

## 2020-01-01 PROCEDURE — 71250 CT THORAX DX C-: CPT

## 2020-01-01 PROCEDURE — 80061 LIPID PANEL: CPT

## 2020-01-01 PROCEDURE — 81003 URINALYSIS AUTO W/O SCOPE: CPT

## 2020-01-01 PROCEDURE — 97162 PT EVAL MOD COMPLEX 30 MIN: CPT

## 2020-01-01 PROCEDURE — 85379 FIBRIN DEGRADATION QUANT: CPT

## 2020-01-01 PROCEDURE — 94760 N-INVAS EAR/PLS OXIMETRY 1: CPT

## 2020-01-01 PROCEDURE — 85025 COMPLETE CBC W/AUTO DIFF WBC: CPT

## 2020-01-01 PROCEDURE — 97110 THERAPEUTIC EXERCISES: CPT

## 2020-01-01 PROCEDURE — 6360000002 HC RX W HCPCS: Performed by: EMERGENCY MEDICINE

## 2020-01-01 PROCEDURE — 94664 DEMO&/EVAL PT USE INHALER: CPT

## 2020-01-01 RX ORDER — POLYETHYLENE GLYCOL 3350 17 G/17G
17 POWDER, FOR SOLUTION ORAL DAILY PRN
Status: DISCONTINUED | OUTPATIENT
Start: 2020-01-01 | End: 2020-01-01 | Stop reason: HOSPADM

## 2020-01-01 RX ORDER — SODIUM CHLORIDE 0.9 % (FLUSH) 0.9 %
10 SYRINGE (ML) INJECTION EVERY 12 HOURS SCHEDULED
Status: DISCONTINUED | OUTPATIENT
Start: 2020-01-01 | End: 2020-01-01 | Stop reason: HOSPADM

## 2020-01-01 RX ORDER — FUROSEMIDE 10 MG/ML
40 INJECTION INTRAMUSCULAR; INTRAVENOUS 2 TIMES DAILY
Status: DISCONTINUED | OUTPATIENT
Start: 2020-01-01 | End: 2020-01-01

## 2020-01-01 RX ORDER — LEVOTHYROXINE SODIUM 0.05 MG/1
50 TABLET ORAL DAILY
Status: DISCONTINUED | OUTPATIENT
Start: 2020-01-01 | End: 2020-01-01

## 2020-01-01 RX ORDER — PREDNISONE 20 MG/1
20 TABLET ORAL DAILY
Qty: 20 TABLET | Refills: 0 | DISCHARGE
Start: 2020-01-01 | End: 2020-01-01

## 2020-01-01 RX ORDER — LEVOTHYROXINE SODIUM 0.03 MG/1
25 TABLET ORAL DAILY
Status: DISCONTINUED | OUTPATIENT
Start: 2020-01-01 | End: 2020-01-01 | Stop reason: HOSPADM

## 2020-01-01 RX ORDER — IPRATROPIUM BROMIDE AND ALBUTEROL SULFATE 2.5; .5 MG/3ML; MG/3ML
1 SOLUTION RESPIRATORY (INHALATION) ONCE
Status: DISCONTINUED | OUTPATIENT
Start: 2020-01-01 | End: 2020-01-01 | Stop reason: HOSPADM

## 2020-01-01 RX ORDER — HEPARIN SODIUM 5000 [USP'U]/ML
5000 INJECTION, SOLUTION INTRAVENOUS; SUBCUTANEOUS EVERY 8 HOURS SCHEDULED
Status: DISCONTINUED | OUTPATIENT
Start: 2020-01-01 | End: 2020-01-01 | Stop reason: HOSPADM

## 2020-01-01 RX ORDER — METHYLPREDNISOLONE SODIUM SUCCINATE 125 MG/2ML
60 INJECTION, POWDER, LYOPHILIZED, FOR SOLUTION INTRAMUSCULAR; INTRAVENOUS ONCE
Status: COMPLETED | OUTPATIENT
Start: 2020-01-01 | End: 2020-01-01

## 2020-01-01 RX ORDER — NICOTINE POLACRILEX 4 MG
15 LOZENGE BUCCAL PRN
Status: DISCONTINUED | OUTPATIENT
Start: 2020-01-01 | End: 2020-01-01 | Stop reason: HOSPADM

## 2020-01-01 RX ORDER — POTASSIUM CHLORIDE 20 MEQ/1
40 TABLET, EXTENDED RELEASE ORAL PRN
Status: DISCONTINUED | OUTPATIENT
Start: 2020-01-01 | End: 2020-01-01 | Stop reason: HOSPADM

## 2020-01-01 RX ORDER — SPIRONOLACTONE 25 MG/1
12.5 TABLET ORAL DAILY
Status: DISCONTINUED | OUTPATIENT
Start: 2020-01-01 | End: 2020-01-01 | Stop reason: HOSPADM

## 2020-01-01 RX ORDER — DEXTROSE MONOHYDRATE 25 G/50ML
12.5 INJECTION, SOLUTION INTRAVENOUS PRN
Status: DISCONTINUED | OUTPATIENT
Start: 2020-01-01 | End: 2020-01-01 | Stop reason: HOSPADM

## 2020-01-01 RX ORDER — GUAIFENESIN 600 MG/1
600 TABLET, EXTENDED RELEASE ORAL 2 TIMES DAILY
Status: DISCONTINUED | OUTPATIENT
Start: 2020-01-01 | End: 2020-01-01 | Stop reason: HOSPADM

## 2020-01-01 RX ORDER — ALBUTEROL SULFATE 2.5 MG/3ML
2.5 SOLUTION RESPIRATORY (INHALATION)
Status: DISCONTINUED | OUTPATIENT
Start: 2020-01-01 | End: 2020-01-01 | Stop reason: HOSPADM

## 2020-01-01 RX ORDER — POTASSIUM CHLORIDE 7.45 MG/ML
10 INJECTION INTRAVENOUS PRN
Status: DISCONTINUED | OUTPATIENT
Start: 2020-01-01 | End: 2020-01-01 | Stop reason: HOSPADM

## 2020-01-01 RX ORDER — CARVEDILOL 3.12 MG/1
3.12 TABLET ORAL 2 TIMES DAILY
COMMUNITY

## 2020-01-01 RX ORDER — ACETAMINOPHEN 325 MG/1
650 TABLET ORAL EVERY 4 HOURS PRN
Status: DISCONTINUED | OUTPATIENT
Start: 2020-01-01 | End: 2020-01-01 | Stop reason: HOSPADM

## 2020-01-01 RX ORDER — CLOPIDOGREL BISULFATE 75 MG/1
75 TABLET ORAL DAILY
Status: DISCONTINUED | OUTPATIENT
Start: 2020-01-01 | End: 2020-01-01 | Stop reason: HOSPADM

## 2020-01-01 RX ORDER — CARVEDILOL 3.12 MG/1
3.12 TABLET ORAL 2 TIMES DAILY
Status: DISCONTINUED | OUTPATIENT
Start: 2020-01-01 | End: 2020-01-01 | Stop reason: HOSPADM

## 2020-01-01 RX ORDER — PANTOPRAZOLE SODIUM 40 MG/1
40 TABLET, DELAYED RELEASE ORAL
Status: DISCONTINUED | OUTPATIENT
Start: 2020-01-01 | End: 2020-01-01 | Stop reason: HOSPADM

## 2020-01-01 RX ORDER — PAROXETINE HYDROCHLORIDE 20 MG/1
10 TABLET, FILM COATED ORAL DAILY
Status: DISCONTINUED | OUTPATIENT
Start: 2020-01-01 | End: 2020-01-01 | Stop reason: HOSPADM

## 2020-01-01 RX ORDER — ONDANSETRON 2 MG/ML
4 INJECTION INTRAMUSCULAR; INTRAVENOUS EVERY 6 HOURS PRN
Status: DISCONTINUED | OUTPATIENT
Start: 2020-01-01 | End: 2020-01-01 | Stop reason: HOSPADM

## 2020-01-01 RX ORDER — IPRATROPIUM BROMIDE AND ALBUTEROL SULFATE 2.5; .5 MG/3ML; MG/3ML
1 SOLUTION RESPIRATORY (INHALATION)
Status: DISCONTINUED | OUTPATIENT
Start: 2020-01-01 | End: 2020-01-01

## 2020-01-01 RX ORDER — FUROSEMIDE 10 MG/ML
40 INJECTION INTRAMUSCULAR; INTRAVENOUS DAILY
Status: DISCONTINUED | OUTPATIENT
Start: 2020-01-01 | End: 2020-01-01

## 2020-01-01 RX ORDER — MAGNESIUM SULFATE 1 G/100ML
1 INJECTION INTRAVENOUS PRN
Status: DISCONTINUED | OUTPATIENT
Start: 2020-01-01 | End: 2020-01-01 | Stop reason: HOSPADM

## 2020-01-01 RX ORDER — GUAIFENESIN 600 MG/1
600 TABLET, EXTENDED RELEASE ORAL 2 TIMES DAILY
Qty: 6 TABLET | Refills: 0 | DISCHARGE
Start: 2020-01-01 | End: 2020-01-01

## 2020-01-01 RX ORDER — SODIUM CHLORIDE 0.9 % (FLUSH) 0.9 %
10 SYRINGE (ML) INJECTION PRN
Status: DISCONTINUED | OUTPATIENT
Start: 2020-01-01 | End: 2020-01-01 | Stop reason: HOSPADM

## 2020-01-01 RX ORDER — FUROSEMIDE 10 MG/ML
40 INJECTION INTRAMUSCULAR; INTRAVENOUS ONCE
Status: COMPLETED | OUTPATIENT
Start: 2020-01-01 | End: 2020-01-01

## 2020-01-01 RX ORDER — ATORVASTATIN CALCIUM 10 MG/1
20 TABLET, FILM COATED ORAL DAILY
Status: DISCONTINUED | OUTPATIENT
Start: 2020-01-01 | End: 2020-01-01 | Stop reason: HOSPADM

## 2020-01-01 RX ORDER — METHYLPREDNISOLONE SODIUM SUCCINATE 40 MG/ML
40 INJECTION, POWDER, LYOPHILIZED, FOR SOLUTION INTRAMUSCULAR; INTRAVENOUS EVERY 6 HOURS
Status: DISCONTINUED | OUTPATIENT
Start: 2020-01-01 | End: 2020-01-01

## 2020-01-01 RX ORDER — ALBUTEROL SULFATE 2.5 MG/3ML
2.5 SOLUTION RESPIRATORY (INHALATION) EVERY 4 HOURS PRN
Status: DISCONTINUED | OUTPATIENT
Start: 2020-01-01 | End: 2020-01-01 | Stop reason: HOSPADM

## 2020-01-01 RX ORDER — ALBUTEROL SULFATE 2.5 MG/3ML
2.5 SOLUTION RESPIRATORY (INHALATION) EVERY 4 HOURS PRN
Qty: 120 EACH | Refills: 3 | DISCHARGE
Start: 2020-01-01

## 2020-01-01 RX ORDER — BENZONATATE 100 MG/1
100 CAPSULE ORAL 3 TIMES DAILY PRN
Status: DISCONTINUED | OUTPATIENT
Start: 2020-01-01 | End: 2020-01-01 | Stop reason: HOSPADM

## 2020-01-01 RX ORDER — DEXTROSE MONOHYDRATE 50 MG/ML
100 INJECTION, SOLUTION INTRAVENOUS PRN
Status: DISCONTINUED | OUTPATIENT
Start: 2020-01-01 | End: 2020-01-01 | Stop reason: HOSPADM

## 2020-01-01 RX ORDER — LISINOPRIL 5 MG/1
2.5 TABLET ORAL DAILY
Status: DISCONTINUED | OUTPATIENT
Start: 2020-01-01 | End: 2020-01-01 | Stop reason: HOSPADM

## 2020-01-01 RX ORDER — PREDNISONE 20 MG/1
20 TABLET ORAL 2 TIMES DAILY
Status: DISCONTINUED | OUTPATIENT
Start: 2020-01-01 | End: 2020-01-01 | Stop reason: HOSPADM

## 2020-01-01 RX ORDER — FUROSEMIDE 40 MG/1
40 TABLET ORAL DAILY
Status: DISCONTINUED | OUTPATIENT
Start: 2020-01-01 | End: 2020-01-01 | Stop reason: HOSPADM

## 2020-01-01 RX ORDER — PROMETHAZINE HYDROCHLORIDE 25 MG/1
12.5 TABLET ORAL EVERY 6 HOURS PRN
Status: DISCONTINUED | OUTPATIENT
Start: 2020-01-01 | End: 2020-01-01 | Stop reason: HOSPADM

## 2020-01-01 RX ORDER — BENZONATATE 100 MG/1
100 CAPSULE ORAL 3 TIMES DAILY PRN
Qty: 15 CAPSULE | Refills: 0 | DISCHARGE
Start: 2020-01-01 | End: 2020-01-01

## 2020-01-01 RX ORDER — BUDESONIDE AND FORMOTEROL FUMARATE DIHYDRATE 80; 4.5 UG/1; UG/1
2 AEROSOL RESPIRATORY (INHALATION) 2 TIMES DAILY
Status: DISCONTINUED | OUTPATIENT
Start: 2020-01-01 | End: 2020-01-01 | Stop reason: HOSPADM

## 2020-01-01 RX ADMIN — PANTOPRAZOLE SODIUM 40 MG: 40 TABLET, DELAYED RELEASE ORAL at 09:06

## 2020-01-01 RX ADMIN — ALBUTEROL SULFATE 2.5 MG: 2.5 SOLUTION RESPIRATORY (INHALATION) at 20:28

## 2020-01-01 RX ADMIN — PAROXETINE HYDROCHLORIDE 10 MG: 20 TABLET, FILM COATED ORAL at 08:25

## 2020-01-01 RX ADMIN — PAROXETINE HYDROCHLORIDE 10 MG: 20 TABLET, FILM COATED ORAL at 08:31

## 2020-01-01 RX ADMIN — BUDESONIDE AND FORMOTEROL FUMARATE DIHYDRATE 2 PUFF: 80; 4.5 AEROSOL RESPIRATORY (INHALATION) at 08:04

## 2020-01-01 RX ADMIN — SPIRONOLACTONE 12.5 MG: 25 TABLET, FILM COATED ORAL at 08:30

## 2020-01-01 RX ADMIN — HEPARIN SODIUM 5000 UNITS: 5000 INJECTION INTRAVENOUS; SUBCUTANEOUS at 21:18

## 2020-01-01 RX ADMIN — ALBUTEROL SULFATE 2.5 MG: 2.5 SOLUTION RESPIRATORY (INHALATION) at 11:27

## 2020-01-01 RX ADMIN — ALBUTEROL SULFATE 2.5 MG: 2.5 SOLUTION RESPIRATORY (INHALATION) at 11:30

## 2020-01-01 RX ADMIN — CARVEDILOL 3.12 MG: 3.12 TABLET, FILM COATED ORAL at 08:30

## 2020-01-01 RX ADMIN — INSULIN LISPRO 1 UNITS: 100 INJECTION, SOLUTION INTRAVENOUS; SUBCUTANEOUS at 12:29

## 2020-01-01 RX ADMIN — ALBUTEROL SULFATE 2.5 MG: 2.5 SOLUTION RESPIRATORY (INHALATION) at 15:42

## 2020-01-01 RX ADMIN — CLOPIDOGREL BISULFATE 75 MG: 75 TABLET ORAL at 08:30

## 2020-01-01 RX ADMIN — METHYLPREDNISOLONE SODIUM SUCCINATE 60 MG: 125 INJECTION, POWDER, FOR SOLUTION INTRAMUSCULAR; INTRAVENOUS at 11:29

## 2020-01-01 RX ADMIN — LISINOPRIL 2.5 MG: 5 TABLET ORAL at 08:31

## 2020-01-01 RX ADMIN — BUDESONIDE AND FORMOTEROL FUMARATE DIHYDRATE 2 PUFF: 80; 4.5 AEROSOL RESPIRATORY (INHALATION) at 20:28

## 2020-01-01 RX ADMIN — FUROSEMIDE 40 MG: 10 INJECTION, SOLUTION INTRAMUSCULAR; INTRAVENOUS at 18:26

## 2020-01-01 RX ADMIN — LEVOTHYROXINE SODIUM 25 MCG: 25 TABLET ORAL at 06:56

## 2020-01-01 RX ADMIN — HEPARIN SODIUM 5000 UNITS: 5000 INJECTION INTRAVENOUS; SUBCUTANEOUS at 14:46

## 2020-01-01 RX ADMIN — ATORVASTATIN CALCIUM 20 MG: 10 TABLET, FILM COATED ORAL at 08:25

## 2020-01-01 RX ADMIN — INSULIN LISPRO 1 UNITS: 100 INJECTION, SOLUTION INTRAVENOUS; SUBCUTANEOUS at 16:52

## 2020-01-01 RX ADMIN — FUROSEMIDE 40 MG: 10 INJECTION, SOLUTION INTRAMUSCULAR; INTRAVENOUS at 09:07

## 2020-01-01 RX ADMIN — METHYLPREDNISOLONE SODIUM SUCCINATE 40 MG: 40 INJECTION, POWDER, FOR SOLUTION INTRAMUSCULAR; INTRAVENOUS at 16:34

## 2020-01-01 RX ADMIN — BUDESONIDE AND FORMOTEROL FUMARATE DIHYDRATE 2 PUFF: 80; 4.5 AEROSOL RESPIRATORY (INHALATION) at 20:14

## 2020-01-01 RX ADMIN — ALBUTEROL SULFATE 2.5 MG: 2.5 SOLUTION RESPIRATORY (INHALATION) at 15:52

## 2020-01-01 RX ADMIN — SODIUM CHLORIDE, PRESERVATIVE FREE 10 ML: 5 INJECTION INTRAVENOUS at 22:09

## 2020-01-01 RX ADMIN — PREDNISONE 20 MG: 20 TABLET ORAL at 08:26

## 2020-01-01 RX ADMIN — ALBUTEROL SULFATE 2.5 MG: 2.5 SOLUTION RESPIRATORY (INHALATION) at 20:14

## 2020-01-01 RX ADMIN — METHYLPREDNISOLONE SODIUM SUCCINATE 40 MG: 40 INJECTION, POWDER, FOR SOLUTION INTRAMUSCULAR; INTRAVENOUS at 22:10

## 2020-01-01 RX ADMIN — CARVEDILOL 3.12 MG: 3.12 TABLET, FILM COATED ORAL at 21:10

## 2020-01-01 RX ADMIN — SODIUM CHLORIDE, PRESERVATIVE FREE 10 ML: 5 INJECTION INTRAVENOUS at 09:07

## 2020-01-01 RX ADMIN — BUDESONIDE AND FORMOTEROL FUMARATE DIHYDRATE 2 PUFF: 80; 4.5 AEROSOL RESPIRATORY (INHALATION) at 07:53

## 2020-01-01 RX ADMIN — HEPARIN SODIUM 5000 UNITS: 5000 INJECTION INTRAVENOUS; SUBCUTANEOUS at 06:16

## 2020-01-01 RX ADMIN — HEPARIN SODIUM 5000 UNITS: 5000 INJECTION INTRAVENOUS; SUBCUTANEOUS at 14:28

## 2020-01-01 RX ADMIN — HEPARIN SODIUM 5000 UNITS: 5000 INJECTION INTRAVENOUS; SUBCUTANEOUS at 08:18

## 2020-01-01 RX ADMIN — METHYLPREDNISOLONE SODIUM SUCCINATE 40 MG: 40 INJECTION, POWDER, FOR SOLUTION INTRAMUSCULAR; INTRAVENOUS at 08:18

## 2020-01-01 RX ADMIN — ATORVASTATIN CALCIUM 20 MG: 10 TABLET, FILM COATED ORAL at 09:06

## 2020-01-01 RX ADMIN — FUROSEMIDE 40 MG: 40 TABLET ORAL at 08:31

## 2020-01-01 RX ADMIN — PANTOPRAZOLE SODIUM 40 MG: 40 TABLET, DELAYED RELEASE ORAL at 06:16

## 2020-01-01 RX ADMIN — HEPARIN SODIUM 5000 UNITS: 5000 INJECTION INTRAVENOUS; SUBCUTANEOUS at 21:10

## 2020-01-01 RX ADMIN — TIOTROPIUM BROMIDE INHALATION SPRAY 2 PUFF: 3.12 SPRAY, METERED RESPIRATORY (INHALATION) at 07:53

## 2020-01-01 RX ADMIN — CARVEDILOL 3.12 MG: 3.12 TABLET, FILM COATED ORAL at 09:05

## 2020-01-01 RX ADMIN — HEPARIN SODIUM 5000 UNITS: 5000 INJECTION INTRAVENOUS; SUBCUTANEOUS at 16:34

## 2020-01-01 RX ADMIN — BUDESONIDE AND FORMOTEROL FUMARATE DIHYDRATE 2 PUFF: 80; 4.5 AEROSOL RESPIRATORY (INHALATION) at 20:09

## 2020-01-01 RX ADMIN — BUDESONIDE AND FORMOTEROL FUMARATE DIHYDRATE 2 PUFF: 80; 4.5 AEROSOL RESPIRATORY (INHALATION) at 08:01

## 2020-01-01 RX ADMIN — FUROSEMIDE 40 MG: 10 INJECTION, SOLUTION INTRAMUSCULAR; INTRAVENOUS at 11:31

## 2020-01-01 RX ADMIN — SODIUM CHLORIDE, PRESERVATIVE FREE 10 ML: 5 INJECTION INTRAVENOUS at 08:26

## 2020-01-01 RX ADMIN — PAROXETINE HYDROCHLORIDE 10 MG: 20 TABLET, FILM COATED ORAL at 09:06

## 2020-01-01 RX ADMIN — METHYLPREDNISOLONE SODIUM SUCCINATE 40 MG: 40 INJECTION, POWDER, FOR SOLUTION INTRAMUSCULAR; INTRAVENOUS at 06:15

## 2020-01-01 RX ADMIN — GUAIFENESIN 600 MG: 600 TABLET, EXTENDED RELEASE ORAL at 08:30

## 2020-01-01 RX ADMIN — CARVEDILOL 3.12 MG: 3.12 TABLET, FILM COATED ORAL at 08:26

## 2020-01-01 RX ADMIN — SODIUM CHLORIDE, PRESERVATIVE FREE 10 ML: 5 INJECTION INTRAVENOUS at 08:31

## 2020-01-01 RX ADMIN — LEVOTHYROXINE SODIUM 25 MCG: 25 TABLET ORAL at 09:05

## 2020-01-01 RX ADMIN — ALBUTEROL SULFATE 2.5 MG: 2.5 SOLUTION RESPIRATORY (INHALATION) at 07:51

## 2020-01-01 RX ADMIN — METHYLPREDNISOLONE SODIUM SUCCINATE 40 MG: 40 INJECTION, POWDER, FOR SOLUTION INTRAMUSCULAR; INTRAVENOUS at 22:44

## 2020-01-01 RX ADMIN — CARVEDILOL 3.12 MG: 3.12 TABLET, FILM COATED ORAL at 22:10

## 2020-01-01 RX ADMIN — ALBUTEROL SULFATE 2.5 MG: 2.5 SOLUTION RESPIRATORY (INHALATION) at 08:00

## 2020-01-01 RX ADMIN — HEPARIN SODIUM 5000 UNITS: 5000 INJECTION INTRAVENOUS; SUBCUTANEOUS at 22:10

## 2020-01-01 RX ADMIN — PREDNISONE 20 MG: 20 TABLET ORAL at 08:31

## 2020-01-01 RX ADMIN — METHYLPREDNISOLONE SODIUM SUCCINATE 40 MG: 40 INJECTION, POWDER, FOR SOLUTION INTRAMUSCULAR; INTRAVENOUS at 16:52

## 2020-01-01 RX ADMIN — ALBUTEROL SULFATE 2.5 MG: 2.5 SOLUTION RESPIRATORY (INHALATION) at 07:57

## 2020-01-01 RX ADMIN — ALBUTEROL SULFATE 2.5 MG: 2.5 SOLUTION RESPIRATORY (INHALATION) at 12:09

## 2020-01-01 RX ADMIN — FUROSEMIDE 40 MG: 10 INJECTION, SOLUTION INTRAMUSCULAR; INTRAVENOUS at 16:35

## 2020-01-01 RX ADMIN — GUAIFENESIN 600 MG: 600 TABLET, EXTENDED RELEASE ORAL at 09:54

## 2020-01-01 RX ADMIN — PREDNISONE 20 MG: 20 TABLET ORAL at 21:19

## 2020-01-01 RX ADMIN — LEVOTHYROXINE SODIUM 25 MCG: 25 TABLET ORAL at 06:16

## 2020-01-01 RX ADMIN — TIOTROPIUM BROMIDE INHALATION SPRAY 2 PUFF: 3.12 SPRAY, METERED RESPIRATORY (INHALATION) at 08:01

## 2020-01-01 RX ADMIN — SODIUM CHLORIDE, PRESERVATIVE FREE 10 ML: 5 INJECTION INTRAVENOUS at 21:19

## 2020-01-01 RX ADMIN — CLOPIDOGREL BISULFATE 75 MG: 75 TABLET ORAL at 09:06

## 2020-01-01 RX ADMIN — ALBUTEROL SULFATE 2.5 MG: 2.5 SOLUTION RESPIRATORY (INHALATION) at 16:04

## 2020-01-01 RX ADMIN — FUROSEMIDE 40 MG: 10 INJECTION, SOLUTION INTRAMUSCULAR; INTRAVENOUS at 08:26

## 2020-01-01 RX ADMIN — ALBUTEROL SULFATE 2.5 MG: 2.5 SOLUTION RESPIRATORY (INHALATION) at 15:41

## 2020-01-01 RX ADMIN — HEPARIN SODIUM 5000 UNITS: 5000 INJECTION INTRAVENOUS; SUBCUTANEOUS at 12:28

## 2020-01-01 RX ADMIN — METHYLPREDNISOLONE SODIUM SUCCINATE 40 MG: 40 INJECTION, POWDER, FOR SOLUTION INTRAMUSCULAR; INTRAVENOUS at 11:27

## 2020-01-01 RX ADMIN — ALBUTEROL SULFATE 2.5 MG: 2.5 SOLUTION RESPIRATORY (INHALATION) at 20:08

## 2020-01-01 RX ADMIN — INSULIN LISPRO 2 UNITS: 100 INJECTION, SOLUTION INTRAVENOUS; SUBCUTANEOUS at 09:04

## 2020-01-01 RX ADMIN — CARVEDILOL 3.12 MG: 3.12 TABLET, FILM COATED ORAL at 21:19

## 2020-01-01 RX ADMIN — PANTOPRAZOLE SODIUM 40 MG: 40 TABLET, DELAYED RELEASE ORAL at 06:56

## 2020-01-01 RX ADMIN — GUAIFENESIN 600 MG: 600 TABLET, EXTENDED RELEASE ORAL at 21:19

## 2020-01-01 RX ADMIN — CLOPIDOGREL BISULFATE 75 MG: 75 TABLET ORAL at 08:26

## 2020-01-01 RX ADMIN — HEPARIN SODIUM 5000 UNITS: 5000 INJECTION INTRAVENOUS; SUBCUTANEOUS at 06:56

## 2020-01-01 RX ADMIN — SODIUM CHLORIDE, PRESERVATIVE FREE 10 ML: 5 INJECTION INTRAVENOUS at 22:44

## 2020-01-01 ASSESSMENT — ENCOUNTER SYMPTOMS
ABDOMINAL PAIN: 0
WHEEZING: 0
NAUSEA: 0
VOMITING: 0
NAUSEA: 0
NAUSEA: 0
DIARRHEA: 0
DIARRHEA: 0
BLOOD IN STOOL: 0
DIARRHEA: 0
BLOOD IN STOOL: 0
ABDOMINAL PAIN: 0
WHEEZING: 0
STRIDOR: 0
CONSTIPATION: 0
COUGH: 1
CHEST TIGHTNESS: 0
ABDOMINAL PAIN: 0
VOMITING: 0
CONSTIPATION: 0
SHORTNESS OF BREATH: 1
WHEEZING: 1
CONSTIPATION: 0
SHORTNESS OF BREATH: 1
CONSTIPATION: 0
COUGH: 0
VOMITING: 0
CHEST TIGHTNESS: 0
BLOOD IN STOOL: 0
DIARRHEA: 0
COUGH: 1
WHEEZING: 0
COUGH: 0
ABDOMINAL PAIN: 0
VOMITING: 0
CHEST TIGHTNESS: 0
SHORTNESS OF BREATH: 1
SHORTNESS OF BREATH: 1
BLOOD IN STOOL: 0
NAUSEA: 0

## 2020-01-01 ASSESSMENT — PAIN SCALES - GENERAL
PAINLEVEL_OUTOF10: 0

## 2020-01-31 NOTE — ED PROVIDER NOTES
[facility]  eMERGENCY dEPARTMENT eNCOUnter      Pt Name: Gifty Underwood  MRN: [id number]  [place] 2/2/1927  Date of evaluation: 1/31/2020      CHIEF COMPLAINT       Chief Complaint   Patient presents with    Bleeding/Bruising     pt had skin lesion removed at dermatologist yesterday (right side of face). Pt woke up and noticed bleeding         HISTORY OF PRESENT ILLNESS      The patient presents with bleeding from a skin wound. The patient had skin cancer removed from his right temple on the 30th. The patient had breakthrough bleeding. The family says they were unable to get it to stop so they called the ambulance to bring him here. The patient does take Plavix. He held it for 1 day before this procedure was done. The patient has no other complaint. He is not dizzy. He has no pain. Nothing makes his symptoms better or worse otherwise. REVIEW OF SYSTEMS       All systems reviewed and negative unless noted in HPI. The patient denies fever or constitutional symptoms. Denies vision change. Denies any sore throat or rhinorrhea. Denies any neck pain or stiffness. Denies chest pain or shortness of breath. Denies musculoskeletal injury or pain. Denies any weakness, numbness or focal neurologic deficit. Bleeding from face where skin cancer was removed. No recent psychiatric issues. Takes blood thinner. Denies any polyuria, polydypsia or history of immunocompromise. PAST MEDICAL HISTORY    has a past medical history of Arthritis, Atrial fibrillation ([address] CAD (coronary artery disease), CHF (congestive heart failure) ([address] Colon cancer ([address] GERD (gastroesophageal reflux disease), Heart attack ([address] History of blood transfusion, History of ulcer disease, Hypercholesterolemia, Hypersomnia with sleep apnea, unspecified, Hypertension, Skin cancer, and Urinary frequency. SURGICAL HISTORY      has a past surgical history that includes colectomy (2006);  Skin cancer excision; Coronary angioplasty with stent (2013, 2003?); eye surgery (Bilateral); Total knee arthroplasty (Bilateral,  (R)  (L)); Colonoscopy; Revision total knee arthroplasty (Left,  ); Hip Arthroplasty (Left, 16); and Cardiac pacemaker placement (Left, 2016). CURRENT MEDICATIONS       Previous Medications    ACETAMINOPHEN 650 MG TABS    Take 650 mg by mouth every 4 hours as needed    CELECOXIB (CELEBREX) 100 MG CAPSULE    Take 100 mg by mouth 2 times daily    CLOPIDOGREL (PLAVIX) 75 MG TABLET    TAKE 1 TABLET EVERY DAY    FLUTICASONE-UMECLIDIN-VILANT (TRELEGY ELLIPTA) 100-62.5-25 MCG/INH AEPB    Inhale 1 puff into the lungs daily    FUROSEMIDE (LASIX) 40 MG TABLET    TAKE 1 TABLET EVERY DAY    LEVOTHYROXINE (SYNTHROID) 25 MCG TABLET    50 mcg     LISINOPRIL (PRINIVIL;ZESTRIL) 2.5 MG TABLET    TAKE 1 TABLET EVERY DAY    NONFORMULARY    Indications: antibiotic for URI     OMEPRAZOLE (PRILOSEC) 20 MG CAPSULE    Take 1 capsule by mouth daily    PAROXETINE (PAXIL) 10 MG TABLET    1 tablet in the morning    SIMVASTATIN (ZOCOR) 40 MG TABLET    Take 1 tablet by mouth nightly    SPIRONOLACTONE (ALDACTONE) 25 MG TABLET    Take 25 mg by mouth daily 1/2 tablet daily       ALLERGIES     is allergic to eliquis [apixaban]; pcn [penicillins]; pollen extract; and rivaroxaban. FAMILY HISTORY     He indicated that his mother is . He indicated that his father is . He indicated that the status of his sister is unknown. He indicated that his brother is alive. family history includes Cancer in his brother; Heart Disease in his father, mother, and sister. SOCIAL HISTORY      reports that he has quit smoking. He has a 150.00 pack-year smoking history. He has never used smokeless tobacco. He reports current alcohol use. He reports that he does not use drugs. PHYSICAL EXAM     INITIAL VITALS:  blood pressure is 122/87 and his pulse is 86.  His respiration is 18 and oxygen saturation is 96%. Patient is alert and oriented, in no apparent distress. HEENT is atraumatic. Pupils are PERRL at 4 mm with normal extraocular motion. Mucous membranes moist.    Neck is supple with no lymphadenopathy. No JVD. No meningismus. Heart sounds regular rate and rhythm with no gallops, murmurs, or rubs. Lungs clear, no wheezes, rales or rhonchi. Abdomen: soft, nontender with no pain to palpation. Musculoskeletal exam shows no evidence of trauma. Normal distal pulses in all extremities. Skin: 2.5 cm sutured wound in right temple. Bleeding noted at the inferior border of the wound, without dehiscence. No evidence of infection or redness. Neurological exam reveals cranial nerves 2 through 12 grossly intact. Patient has equal  and normal deep tendon reflexes. Psychiatric: no hallucinations or suicidal ideation. Lymphatics.:  No lymphadenopathy. DIFFERENTIAL DIAGNOSIS/ MDM:     Wound bleeding, infection, dehiscence    DIAGNOSTIC RESULTS         EMERGENCY DEPARTMENT COURSE:   Vitals:    Vitals:    01/31/20 0347   BP: 122/87   Pulse: 86   Resp: 18   SpO2: 96%     -------------------------  BP: 122/87,  , Pulse: 86, Resp: 18      Re-evaluation Notes    Direct pressure was applied and a dressing with Surgicel was also applied. The patient was given wound care instructions. He is discharged in good condition. PROCEDURES:    The patient's wound was dressed after direct pressure was applied. FINAL IMPRESSION      1.  Hemorrhage from wound          DISPOSITION/PLAN   DISPOSITION Decision To Discharge 01/31/2020 04:13:38 AM      Condition on Disposition    good    PATIENT REFERRED TO:  MD JORJE Rodriguez [address]  [address] [place] [address]  [phone]    In 2 days        DISCHARGE MEDICATIONS:  New Prescriptions    No medications on file       (Please note that portions of this note were completed with a voice recognition program.  Efforts were made

## 2020-02-28 PROBLEM — N18.9 CHRONIC KIDNEY DISEASE: Status: ACTIVE | Noted: 2020-01-01

## 2020-02-28 PROBLEM — K21.9 GASTROESOPHAGEAL REFLUX DISEASE WITHOUT ESOPHAGITIS: Chronic | Status: ACTIVE | Noted: 2018-09-27

## 2020-02-28 PROBLEM — I50.41 CHF (CONGESTIVE HEART FAILURE), NYHA CLASS III, ACUTE, COMBINED (HCC): Status: ACTIVE | Noted: 2020-01-01

## 2020-02-28 PROBLEM — I27.20 PULMONARY HTN (HCC): Chronic | Status: ACTIVE | Noted: 2018-06-04

## 2020-02-28 PROBLEM — G47.33 OBSTRUCTIVE SLEEP APNEA SYNDROME: Chronic | Status: ACTIVE | Noted: 2018-09-27

## 2020-02-28 PROBLEM — J44.1 COPD EXACERBATION (HCC): Status: ACTIVE | Noted: 2020-01-01

## 2020-02-28 PROBLEM — Z95.0 PACEMAKER: Chronic | Status: ACTIVE | Noted: 2017-05-01

## 2020-02-28 PROBLEM — J43.8 OTHER EMPHYSEMA (HCC): Chronic | Status: ACTIVE | Noted: 2020-01-01

## 2020-02-28 PROBLEM — E03.9 ACQUIRED HYPOTHYROIDISM: Chronic | Status: ACTIVE | Noted: 2018-09-27

## 2020-02-28 NOTE — H&P
[facility]    HISTORY AND PHYSICAL EXAMINATION            Date:   2/28/2020  Patient name:  Indy Gunter  Date of admission:  2/28/2020  9:21 AM  MRN:   [id number]  Account:  [de-identified]  YOB: 1927  PCP:    Wang Brooke MD  Room:   ER08/ER  Code Status:    Prior    Chief Complaint:     Chief Complaint   Patient presents with    Shortness of Breath       History Obtained From:     patient, electronic medical record    History of Present Illness:     Indy Gunter is a 80 y.o. Non-/non  male who presents with Shortness of Breath   and is admitted to the hospital for the management of CHF (congestive heart failure), NYHA class III, acute, combined ([facility] [address] The patient presents via EMS with complaints of shortness of breath and cough that have progressively worsened over the last 2 to 3 months. The patient and his friend, at bedside, state he has a history of CHF and COPD. He reports using his nebulizer twice this morning without much relief and he received a DuoNeb via EMS which did provide some relief. He denies chest pain, nausea, vomiting, fever or chills. He does state that related to fatigue and weakness is been difficult for him to ambulate over the last few days. His history includes chronic diastolic and systolic CHF, COPD, stents, chronic A. fib, a pacemaker, colon cancer/colostomy and ex-smoker. During my assessment the patient is clearly in some respiratory distress. He is able to carry on a conversation but he does a occasionally pursed lip breathing or gasp related to air hunger. At rest he does improve. According to the patient's notes he has refused anticoagulation related to his A. fib related to bleeding around his colostomy and other risk of bleeding. We will continue Lasix 40 mg IV twice daily, Solu-Medrol 40 mg every 6 hours, albuterol every 4 hours while awake. Oxygen as needed. Add meter glucose before meals and at bedtime related to steroid use. Trend troponin and recheck BNP every other day. Pertinent data  On arrival to the ER vitals 136/70, 70, 17, 97.7 and 94% on 3 L nasal cannula    Chest x-ray reveals volume overload with effusions and bibasilar airspace disease. CT chest without contrast reveals small pleural effusions. No consolidation. The patient has moderate emphysema    Potassium 3.6, BUN/creatinine 36/1.42, GFR 47. (his creatine has ranged btw 1.4-1.6 since 2019)  Magnesium 1.7 glucose 108  Myoglobin 82   BNP 15,690 (his BNP has ranged btw 8878-6006 since 2019)  Initial troponin 88 first repeat 87 (according to the chart the patient's average troponin is in the mid to high 70's)  H&H 12.6/37.7 and platelets 086  Rapid flu negative        Past Medical History:     Past Medical History:   Diagnosis Date    Arthritis     Atrial fibrillation ([address]     CAD (coronary artery disease) 9/11/2013    2 stents placed    CHF (congestive heart failure) ([address]     dx in [facility] testing came back negative    Colon cancer ([address] 2006    s/p colectomy and colostomy     GERD (gastroesophageal reflux disease)     Heart attack ([address]     History of blood transfusion     autologus    History of ulcer disease     Hypercholesterolemia     Hypersomnia with sleep apnea, unspecified     uses CPAP nightly    Hypertension     Skin cancer     basil cell ca    Urinary frequency         Past Surgical History:     Past Surgical History:   Procedure Laterality Date    CARDIAC PACEMAKER PLACEMENT Left 09/2016    [employer]     COLECTOMY  2006    with colostomy    COLONOSCOPY      CORONARY ANGIOPLASTY WITH STENT PLACEMENT  9/11/2013, 2003?     2013 (x2 stents) 2003? balloon angioplasty    EYE SURGERY Bilateral     cataract extraction    HIP ARTHROPLASTY Left 6/8/16    REVISION TOTAL KNEE ARTHROPLASTY Left 2004     SKIN CANCER EXCISION      TOTAL KNEE ARTHROPLASTY Bilateral 2008 (R) 2004 (L)    knee        Medications Prior to Admission:     Prior to Admission medications    Medication Sig Start Date End Date Taking? Authorizing Provider   fluticasone-umeclidin-vilant (TRELEGY ELLIPTA) 100-62.5-25 MCG/INH AEPB Inhale 1 puff into the lungs daily    Historical Provider, MD   spironolactone (ALDACTONE) 25 MG tablet Take 25 mg by mouth daily 1/2 tablet daily    Historical Provider, MD   celecoxib (CELEBREX) 100 MG capsule Take 100 mg by mouth 2 times daily    Historical Provider, MD   furosemide (LASIX) 40 MG tablet TAKE 1 TABLET EVERY DAY 12/2/19   Cande Abrams MD   lisinopril (PRINIVIL;ZESTRIL) 2.5 MG tablet TAKE 1 TABLET EVERY DAY 12/2/19   Cande Abrams MD   clopidogrel (PLAVIX) 75 MG tablet TAKE 1 TABLET EVERY DAY 12/2/19   Cande Abrams MD   NONFORMULARY Indications: antibiotic for URI     Historical Provider, MD   PARoxetine (PAXIL) 10 MG tablet 1 tablet in the morning    Historical Provider, MD   levothyroxine (SYNTHROID) 25 MCG tablet 50 mcg     Historical Provider, MD   omeprazole (PRILOSEC) 20 MG capsule Take 1 capsule by mouth daily 8/17/16   Cande Abrams MD   acetaminophen 650 MG TABS Take 650 mg by mouth every 4 hours as needed 6/11/16   Joel Nugent MD   simvastatin (ZOCOR) 40 MG tablet Take 1 tablet by mouth nightly 5/5/16   Cande Abrams MD        Allergies:     Eliquis [apixaban]; Pcn [penicillins]; Pollen extract; and Rivaroxaban    Social History:     Tobacco:    reports that he has quit smoking. He has a 150.00 pack-year smoking history. He has never used smokeless tobacco.  Alcohol:      reports current alcohol use. Drug Use:  reports no history of drug use. Family History:     Family History   Problem Relation Age of Onset    Heart Disease Mother     Heart Disease Father     Cancer Brother     Heart Disease Sister        Review of Systems:     Positive and Negative as described in HPI.     Review of Systems Constitutional: Negative for chills, diaphoresis and fever. HENT: Negative for congestion and hearing loss. Respiratory: Positive for cough, shortness of breath and wheezing. Negative for stridor. Cardiovascular: Negative for chest pain, palpitations and leg swelling. Gastrointestinal: Negative for abdominal pain, blood in stool, constipation, diarrhea, nausea and vomiting. Genitourinary: Negative for dysuria and frequency. Musculoskeletal: Positive for gait problem. Negative for myalgias. Skin: Negative for rash. Neurological: Positive for weakness. Negative for dizziness, seizures and headaches. Psychiatric/Behavioral: The patient is not nervous/anxious. Physical Exam:   /75   Pulse 70   Temp 97.7 °F (36.5 °C) (Axillary)   Resp 20   Ht 5' 11\" (1.803 m)   Wt 190 lb (86.2 kg)   SpO2 92%   BMI 26.50 kg/m²   Temp (24hrs), Av.7 °F (36.5 °C), Min:97.7 °F (36.5 °C), Max:97.7 °F (36.5 °C)    No results for input(s): POCGLU in the last 72 hours. No intake or output data in the 24 hours ending 20 1351    Physical Exam  Vitals signs and nursing note reviewed. Constitutional:       Appearance: He is ill-appearing (He does appear in mild respiratory distress with conversation but recovers quickly with rest). HENT:      Mouth/Throat:      Mouth: Mucous membranes are moist.   Eyes:      Extraocular Movements: Extraocular movements intact. Conjunctiva/sclera: Conjunctivae normal.      Pupils: Pupils are equal, round, and reactive to light. Neck:      Musculoskeletal: Normal range of motion. Vascular: No JVD. Cardiovascular:      Rate and Rhythm: Normal rate and regular rhythm. Pulses: Normal pulses. Heart sounds: Murmur present. Pulmonary:      Effort: Tachypnea and respiratory distress (Mild during conversation) present. Breath sounds: Decreased air movement present. Examination of the right-upper field reveals decreased breath sounds. 31.6 26 - 34 pg    MCHC 33.4 31 - 37 g/dL    RDW 14.6 12.5 - 15.4 %    Platelets 530 883 - 540 k/uL    MPV 8.5 6.0 - 12.0 fL    NRBC Automated NOT REPORTED per 100 WBC    Differential Type NOT REPORTED     Seg Neutrophils 79 (H) 36 - 66 %    Lymphocytes 9 (L) 24 - 44 %    Monocytes 10 2 - 11 %    Eosinophils % 1 1 - 4 %    Basophils 1 0 - 2 %    Immature Granulocytes NOT REPORTED 0 %    Segs Absolute 4.60 1.8 - 7.7 k/uL    Absolute Lymph # 0.50 (L) 1.0 - 4.8 k/uL    Absolute Mono # 0.60 0.1 - 1.2 k/uL    Absolute Eos # 0.00 0.0 - 0.4 k/uL    Basophils Absolute 0.00 0.0 - 0.2 k/uL    Absolute Immature Granulocyte NOT REPORTED 0.00 - 0.30 k/uL    WBC Morphology NOT REPORTED     RBC Morphology NOT REPORTED     Platelet Estimate NOT REPORTED    SPECIMEN REJECTION    Collection Time: 02/28/20  9:35 AM   Result Value Ref Range    Specimen Source . BLOOD     Ordered Test DIME,PT,PTT,BMP,BNP,CK,CKMBQN,LIVP,MG,WESLY,TROPI     Reason for Rejection Unable to perform testing: Specimen hemolyzed. - NOT REPORTED    Rapid Influenza A/B Antigens    Collection Time: 02/28/20  9:42 AM   Result Value Ref Range    Specimen Description . NASOPHARYNGEAL SWAB     Special Requests NOT REPORTED     Direct Exam       NEGATIVE for Influenza A + B antigens. PCR testing to confirm this result is available upon request.  Specimen will be saved in the laboratory for 7 days. Please call [phone] if PCR testing is indicated.    Basic Metabolic Panel    Collection Time: 02/28/20 10:21 AM   Result Value Ref Range    Glucose 108 (H) 70 - 99 mg/dL    BUN 36 (H) 8 - 23 mg/dL    CREATININE 1.42 (H) 0.70 - 1.20 mg/dL    Bun/Cre Ratio NOT REPORTED 9 - 20    Calcium 8.9 8.6 - 10.4 mg/dL    Sodium 140 135 - 144 mmol/L    Potassium 3.6 (L) 3.7 - 5.3 mmol/L    Chloride 105 98 - 107 mmol/L    CO2 20 20 - 31 mmol/L    Anion Gap 15 9 - 17 mmol/L    GFR Non-African American 47 (L) >60 mL/min    GFR  56 (L) >60 mL/min    GFR Comment          GFR Staging NOT REPORTED    Brain Natriuretic Peptide    Collection Time: 02/28/20 10:21 AM   Result Value Ref Range    Pro-BNP 15,690 (H) <300 pg/mL    BNP Interpretation Pro-BNP Reference Range:    CK    Collection Time: 02/28/20 10:21 AM   Result Value Ref Range    Total CK 40 39 - 308 U/L   CK MB    Collection Time: 02/28/20 10:21 AM   Result Value Ref Range    CK-MB 2.8 <10.5 ng/mL   D-Dimer, Quantitative    Collection Time: 02/28/20 10:21 AM   Result Value Ref Range    D-Dimer, Quant 0.92 mg/L FEU   Hepatic Function Panel    Collection Time: 02/28/20 10:21 AM   Result Value Ref Range    Alb 3.7 3.5 - 5.2 g/dL    Alkaline Phosphatase 66 40 - 129 U/L    ALT 12 5 - 41 U/L    AST 18 <40 U/L    Total Bilirubin 1.34 (H) 0.3 - 1.2 mg/dL    Bilirubin, Direct 0.50 (H) <0.31 mg/dL    Bilirubin, Indirect 0.84 0.00 - 1.00 mg/dL    Total Protein 6.2 (L) 6.4 - 8.3 g/dL    Globulin NOT REPORTED 1.5 - 3.8 g/dL    Albumin/Globulin Ratio 1.5 1.0 - 2.5   Magnesium    Collection Time: 02/28/20 10:21 AM   Result Value Ref Range    Magnesium 1.7 1.6 - 2.6 mg/dL   Myoglobin, Serum    Collection Time: 02/28/20 10:21 AM   Result Value Ref Range    Myoglobin 82 (H) 28 - 72 ng/mL   Protime-INR    Collection Time: 02/28/20 10:21 AM   Result Value Ref Range    Protime 11.1 9.4 - 12.6 sec    INR 1.1    APTT    Collection Time: 02/28/20 10:21 AM   Result Value Ref Range    PTT 26.7 21.3 - 31.3 sec   Troponin    Collection Time: 02/28/20 10:21 AM   Result Value Ref Range    Troponin, High Sensitivity 88 (HH) 0 - 22 ng/L    Troponin T NOT REPORTED <0.03 ng/mL    Troponin Interp NOT REPORTED    Troponin    Collection Time: 02/28/20 11:54 AM   Result Value Ref Range    Troponin, High Sensitivity 87 (HH) 0 - 22 ng/L    Troponin T NOT REPORTED <0.03 ng/mL    Troponin Interp NOT REPORTED        Imaging/Diagnostics:    Ct Chest Wo Contrast    Result Date: 2/28/2020  Small pleural effusions. No consolidation.      Dickson Prasad Chest Portable    Result Date: 2/28/2020  Findings of volume overload with effusions and bibasilar airspace disease. 9/25/2019 echocardiogram complete 2D with Doppler  Summary  Normal left ventricle size and function with an estimated EF 50-55%. Moderate left ventricular hypertrophy. Left atrium is mildly dilated. Aortic valve sclerosis without stenosis. Moderate aortic insufficiency. Thickened mitral valve leaflets. Mild mitral annular calcification. Mild tricuspid regurgitation. Trivial to mild pulmonic insufficiency. Aortic root dimension is at upper limit of normal.  The ascending aorta is mildly dilated measuring 4.1cm.       Assessment :      Hospital Problems           Last Modified POA    * (Principal) CHF (congestive heart failure), NYHA class III, acute, combined ([address] 2/28/2020 Yes    Coronary artery disease involving native coronary artery of native heart without angina pectoris (Chronic) 2/28/2020 Yes    Hyperlipidemia (Chronic) 2/28/2020 Yes    Nonrheumatic aortic valve insufficiency (Chronic) 2/28/2020 Yes    Essential hypertension (Chronic) 2/28/2020 Yes    Chronic atrial fibrillation 2/28/2020 Yes    Pacemaker 2/28/2020 Yes    Pulmonary HTN ([address] 2/28/2020 Yes    Acquired hypothyroidism 2/28/2020 Yes    Obstructive sleep apnea syndrome 2/28/2020 Yes    Overview Signed 8/26/2019 11:59 AM by ROBERT Abreu - NP     Intolerant of CPAP- has not worn since 2018         Chronic kidney disease 2/28/2020 Yes    COPD exacerbation ([address] 2/28/2020 Yes    Other emphysema ([address] (Chronic) 2/28/2020 Yes          Plan:     Patient status inpatient in the Progressive Unit/Step down    1. Admit as progressive status  2. Lasix 40 mg IV twice daily  3. Solu-Medrol 40 mg IV every 6 hours  4. Heparin for DVT prophylaxis  5. Labs in a.m.; CBC/BMP, TSH, lipids. Check BNP every other day  6. Chest x-ray in a.m.  7. Oxygen as needed  8. Encourage cough and deep breathing  9. Low-sodium diet  10.  Continue

## 2020-02-29 NOTE — PLAN OF CARE
[address] [place] [address]    Second Visit Note  For more detailed information please refer to the progress note of the day      2/29/2020    4:58 PM    Name:   Lois Sims  MRN:     [id number]     Acct:      [de-identified]   Room:   [address] [place] [address] Day:  1  Admit Date:  2/28/2020  9:21 AM    PCP:   Kirk Treviño MD  Code Status:  DNR-CC        Pt vitals were reviewed   New labs were reviewed   Patient was seen    He was assisted to the chair per PT/OT. He did desat into the 70's but recovered with rest. He states he is feeling slightly better but he remains pretty weak. Updated plan :     1. Plan for [facility]/rehab at discharge  2.  Continue to monitor pulsox and adjust oxygen as able        Rosalea Scheuermann, APRN - CNP  2/29/2020  4:58 PM

## 2020-02-29 NOTE — PLAN OF CARE
Problem: Falls - Risk of:  Goal: Will remain free from falls  Description  Will remain free from falls  2/29/2020 0949 by Reynaldo Oakes RN  Outcome: Ongoing     Problem: Risk for Impaired Skin Integrity  Goal: Tissue integrity - skin and mucous membranes  Description  Structural intactness and normal physiological function of skin and  mucous membranes. 2/29/2020 0949 by Reynaldo Oakes RN  Outcome: Ongoing     Problem: OXYGENATION/RESPIRATORY FUNCTION  Goal: Patient will maintain patent airway  2/29/2020 0949 by Reynaldo Oakes RN  Outcome: Ongoing     Problem: OXYGENATION/RESPIRATORY FUNCTION  Goal: Patient will achieve/maintain normal respiratory rate/effort  Description  Respiratory rate and effort will be within normal limits for the patient  Outcome: Ongoing     Problem: ACTIVITY INTOLERANCE/IMPAIRED MOBILITY  Goal: Mobility/activity is maintained at optimum level for patient  2/29/2020 0949 by Reynaldo Oakes RN  Outcome: Ongoing     Problem:  Activity:  Goal: Risk for activity intolerance will decrease  Description  Risk for activity intolerance will decrease  Outcome: Ongoing     Problem: Fluid Volume:  Goal: Ability to maintain a balanced intake and output will improve  Description  Ability to maintain a balanced intake and output will improve  Outcome: Ongoing     Problem: Health Behavior:  Goal: Ability to identify and utilize available resources and services will improve  Description  Ability to identify and utilize available resources and services will improve  Outcome: Ongoing     Problem: Nutritional:  Goal: Maintenance of adequate nutrition will improve  Description  Maintenance of adequate nutrition will improve  Outcome: Ongoing     Problem: Physical Regulation:  Goal: Complications related to the disease process, condition or treatment will be avoided or minimized  Description  Complications related to the disease process, condition or treatment will be avoided or minimized  Outcome: Ongoing     Problem: Skin Integrity:  Goal: Risk for impaired skin integrity will decrease  Description  Risk for impaired skin integrity will decrease  Outcome: Ongoing     Problem: Tissue Perfusion:  Goal: Adequacy of tissue perfusion will improve  Description  Adequacy of tissue perfusion will improve  Outcome: Ongoing

## 2020-02-29 NOTE — PROGRESS NOTES
attendants perform meal prep, laundry, and cleaning tasks; dtr and significant other perform medication mngt)  Ambulation Assistance: Independent(with use of 4WW)  Transfer Assistance: Independent  Active : No  Patient's  Info: dtr drives or facility has a bus for transportation  Occupation: Retired  Type of occupation: Previously worked as a  for [address]: Attends activities at [employer]  Overall Cognitive Status: Exceptions  Attention Span: Attends with cues to redirect; Difficulty attending to directions  Safety Judgement: Decreased awareness of need for assistance  Problem Solving: Assistance required to generate solutions;Assistance required to implement solutions  Insights: Decreased awareness of deficits    Objective          AROM RLE (degrees)  RLE AROM: WFL  AROM LLE (degrees)  LLE AROM : WFL  AROM RUE (degrees)  RUE AROM : WFL  AROM LUE (degrees)  LUE AROM : WFL  Strength RLE  Comment: grossly 4/5  Strength LLE  Comment: grossly 4/5  Strength RUE  Strength RUE: WFL  Comment: co evaluation with OT- see OT evaluation for full UE assessment  Strength LUE  Strength LUE: WFL  Comment: co evaluation with OT- see OT evaluation for full UE assessment     Sensation  Overall Sensation Status: Impaired  Additional Comments: Pt reports numbness/tingling to LLE intermittently for ~1wk  Bed mobility  Supine to Sit: Moderate assistance  Sit to Supine: (Pt retired to chair at end of session)  Scooting: Minimal assistance  Comment: Increased time and cues to focus on sequencing of mobility  Transfers  Sit to Stand: Minimal Assistance  Stand to sit: Minimal Assistance  Ambulation  Ambulation?: Yes  Ambulation 1  Surface: level tile  Device: Rolling Walker, 2L of oxygen  Assistance: Minimal assistance  Quality of Gait: decreased step length, decreased gait speed, decreased endurance- drop in oxygen saturation 73% during gait despite cues for

## 2020-02-29 NOTE — PROGRESS NOTES
[facility]    Progress Note    2/29/2020    7:03 AM    Name:   Ramo Bear  MRN:     [id number]     Acct:      [de-identified]   Room:   [address] [place] [address] Day:  1  Admit Date:  2/28/2020  9:21 AM    PCP:   Cam Kennedy MD  Code Status:  DNR-CC    Subjective:     C/C:   Chief Complaint   Patient presents with    Shortness of Breath     Interval History Status: improved. He states he feels better and his lungs do not sound as tight in the bases, but he still gets winded during a conversation. Encouraged activity as he tolerates. He states he feels to weak to attempt much activity. Good appetite. Brief History:     Ramo Bear is a 80 y.o. Non-/non  male who presents with Shortness of Breath   and is admitted to the hospital for the management of CHF (congestive heart failure), NYHA class III, acute, combined ([facility] [address] The patient presents via EMS with complaints of shortness of breath and cough that have progressively worsened over the last 2 to 3 months. The patient and his friend, at bedside, state he has a history of CHF and COPD. He reports using his nebulizer twice this morning without much relief and he received a DuoNeb via EMS which did provide some relief. He denies chest pain, nausea, vomiting, fever or chills. He does state that related to fatigue and weakness is been difficult for him to ambulate over the last few days. His history includes chronic diastolic and systolic CHF, COPD, stents, chronic A. fib, a pacemaker, colon cancer/colostomy and ex-smoker. During my assessment the patient is clearly in some respiratory distress. He is able to carry on a conversation but he does a occasionally pursed lip breathing or gasp related to air hunger. At rest he does improve.   According to the patient's notes he has refused anticoagulation related to his A. fib related to bleeding around his colostomy and other risk of bleeding.     We will continue Lasix 40 mg IV twice daily, Solu-Medrol 40 mg every 6 hours, albuterol every 4 hours while awake. Oxygen as needed. Add meter glucose before meals and at bedtime related to steroid use. Trend troponin and recheck BNP every other day    2/29 vital signs stable, proBNP has increased to 25,687. Troponins trending down. ISS for glucose management while on Solu-Medrol. Waiting for cardiology imput. Review of Systems:     Review of Systems   Constitutional: Negative for chills, diaphoresis and fever. HENT: Negative for congestion. Eyes: Negative for visual disturbance. Respiratory: Positive for cough and shortness of breath. Negative for chest tightness and wheezing. Cardiovascular: Negative for chest pain, palpitations and leg swelling. Gastrointestinal: Negative for abdominal pain, blood in stool, constipation, diarrhea, nausea and vomiting. Genitourinary: Negative for difficulty urinating. Neurological: Positive for weakness. Negative for dizziness, light-headedness, numbness and headaches. All other systems reviewed and are negative. Medications: Allergies:     Allergies   Allergen Reactions    Eliquis [Apixaban] Other (See Comments)     feels funny     Pcn [Penicillins]     Pollen Extract     Rivaroxaban Other (See Comments)       Current Meds:   Scheduled Meds:    ipratropium-albuterol  1 ampule Inhalation Once    clopidogrel  75 mg Oral Daily    pantoprazole  40 mg Oral QAM AC    PARoxetine  10 mg Oral Daily    atorvastatin  20 mg Oral Daily    sodium chloride flush  10 mL Intravenous 2 times per day    heparin (porcine)  5,000 Units Subcutaneous 3 times per day    furosemide  40 mg Intravenous BID    methylPREDNISolone  40 mg Intravenous Q6H    tiotropium  2 puff Inhalation Daily    budesonide-formoterol  2 puff Inhalation BID    carvedilol  3.125 mg Oral BID    levothyroxine  25 mcg Oral Daily    albuterol  2.5 mg Nebulization 02/28/20 2025 02/29/20  0433     --   --   --  138   K 3.6*  --   --   --  3.8     --   --   --  103   CO2 20  --   --   --  19*   GLUCOSE 108*  --   --   --  163*   BUN 36*  --   --   --  38*   CREATININE 1.42*  --   --   --  1.32*   MG 1.7  --   --   --  1.8   ANIONGAP 15  --   --   --  16   LABGLOM 47*  --   --   --  51*   GFRAA 56*  --   --   --  >60   CALCIUM 8.9  --   --   --  8.9   PROBNP 15,690*  --   --   --  25,687*   TROPHS 88*   < > 82* 71* 67*   CKTOTAL 40  --   --   --   --    CKMB 2.8  --   --   --   --    MYOGLOBIN 82*  --   --   --  101*    < > = values in this interval not displayed. Recent Labs     02/28/20  1021 02/28/20  2251   PROT 6.2*  --    LABALBU 3.7  --    AST 18  --    ALT 12  --    ALKPHOS 66  --    BILITOT 1.34*  --    BILIDIR 0.50*  --    POCGLU  --  153*     ABG:No results found for: POCPH, PHART, PH, POCPCO2, BTI5VSN, PCO2, POCPO2, PO2ART, PO2, POCHCO3, QCZ3HTA, HCO3, NBEA, PBEA, BEART, BE, THGBART, THB, FHK2NYV, FENK1JDD, R0JHQHJS, O2SAT, FIO2  Lab Results   Component Value Date/Time    SPECIAL NOT REPORTED 02/28/2020 09:42 AM     Lab Results   Component Value Date/Time    CULTURE NO GROWTH 11/17/2019 07:47 PM       Radiology:  Ct Chest Wo Contrast    Result Date: 2/28/2020  Small pleural effusions. No consolidation. Xr Chest Portable    Result Date: 2/28/2020  Findings of volume overload with effusions and bibasilar airspace disease. Physical Examination:     Physical Exam  Vitals signs and nursing note reviewed. HENT:      Mouth/Throat:      Mouth: Mucous membranes are moist.   Eyes:      Extraocular Movements: Extraocular movements intact. Pupils: Pupils are equal, round, and reactive to light. Neck:      Musculoskeletal: Normal range of motion. Cardiovascular:      Rate and Rhythm: Normal rate. Pulses: Normal pulses. Heart sounds: Murmur present.    Pulmonary:      Effort: Pulmonary effort is normal. Tachypnea (during conversation) 1. Continue Lasix 40 mg IV twice a day  2. Titrate oxygen as he tolerates  3. Solu-Medrol 40 mg IV every 6 hours  4. Heparin for DVT prophylaxis  5. Continue to follow BMP and BNP  6. Chest x-ray in am  7. Encourage cough and deep breathing  8. Low-sodium diet  9. Home medications reviewed and restarted as appropriate  10. PT/OT to evaluate and treat  11. Monitor I&O and daily weight related to CHF  12. Home O2 evaluation prior to discharge  13. DNR CC  14. Cardiology consult. Appreciate their recommendations   15.  The patient may benefit from a repeat echo    ROBERT Arce CNP  2/29/2020  7:03 AM

## 2020-02-29 NOTE — PROGRESS NOTES
Occupational Therapy   Occupational Therapy Initial Assessment  Date: 2020   Patient Name: Joy Monteiro  MRN: [id number]     : 1927    Date of Service: 2020    Discharge Recommendations:  Patient would benefit from continued therapy after discharge   Pt currently requiring min A to perform all functional transfers/functional mobility and min A to mod A to perform ADLs; pt with significantly limited activity tolerance throughout all functional tasks. Pt to benefit from continued therapy while hospitalized and at discharge. Assessment   Performance deficits / Impairments: Decreased functional mobility ; Decreased ADL status; Decreased endurance;Decreased strength;Decreased safe awareness;Decreased balance;Decreased coordination  Prognosis: Good  Decision Making: Medium Complexity  OT Education: OT Role;Plan of Care;Precautions; ADL Adaptive Strategies;Transfer Training;Equipment; Energy Conservation  REQUIRES OT FOLLOW UP: Yes  Activity Tolerance  Activity Tolerance: Patient limited by fatigue  Safety Devices  Safety Devices in place: Yes  Type of devices: Call light within reach;Nurse notified; Left in chair;Chair alarm in place  Restraints  Initially in place: No           Patient Diagnosis(es): The primary encounter diagnosis was Acute on chronic combined systolic and diastolic congestive heart failure ([address] A diagnosis of COPD exacerbation ([address] was also pertinent to this visit. has a past medical history of Arthritis, Atrial fibrillation ([address] CAD (coronary artery disease), CHF (congestive heart failure) ([address] Colon cancer ([address] GERD (gastroesophageal reflux disease), Heart attack ([address] History of blood transfusion, History of ulcer disease, Hypercholesterolemia, Hypersomnia with sleep apnea, unspecified, Hypertension, Skin cancer, and Urinary frequency. has a past surgical history that includes colectomy ();  Skin cancer excision; Coronary angioplasty with stent (2013, directions  Safety Judgement: Decreased awareness of need for assistance  Problem Solving: Assistance required to generate solutions;Assistance required to implement solutions  Insights: Decreased awareness of deficits           Sensation  Overall Sensation Status: Impaired  Additional Comments: Pt reports numbness/tingling to LLE intermittently for ~1wk        LUE AROM (degrees)  LUE AROM : WFL  RUE AROM (degrees)  RUE AROM : WFL  LUE Strength  Gross LUE Strength: (4-/5 at shoulders in all planes within available range, 4/5 distal to shoulder)  RUE Strength  Gross RUE Strength: (4-/5 at shoulders in all planes within available range, 4/5 distal to shoulder)           Plan   Plan  Times per week: 5-6x/wk  Current Treatment Recommendations: Strengthening, Functional Mobility Training, Endurance Training, Balance Training, Safety Education & Training, Patient/Caregiver Education & Training, Equipment Evaluation, Education, & procurement, Home Management Training, Self-Care / ADL      AM-PAC Score  AM-[facility] Inpatient Daily Activity Raw Score: 16 (02/29/20 1535)  AM-PAC Inpatient ADL T-Scale Score : 35.96 (02/29/20 1535)  ADL Inpatient CMS 0-100% Score: 53.32 (02/29/20 1535)  ADL Inpatient CMS G-Code Modifier : CK (02/29/20 1535)    Goals  Short term goals  Time Frame for Short term goals: 14 visits  Short term goal 1: pt will independently demo good safety awareness during ADLs and functional transfers/functional mobility  Short term goal 2: pt will perform grooming/UB ADL tasks independently  Short term goal 3: pt will perform toileting/LB ADL tasks with min A using AE/DME PRN  Short term goal 4: pt will demo 10+ minutes standing tolerance with use of RW for increased participation in ADLs  Short term goal 5: pt will independently demo ability to incorporate energy conservation techniques as needed during ADLs and functional transfers/functional transfers  Short term goal 6: pt will perform functional

## 2020-02-29 NOTE — CONSULTS
Community acquired pneumonia of left lower lobe of lung ([facility] [address]    [facility]    UTI due to Klebsiella species    MRSA infection:  UTI    CHF (congestive heart failure), NYHA class III, acute, combined ([facility]    Chronic kidney disease    COPD exacerbation ([facility]    Other emphysema ([facility]     He  reports that he has quit smoking. He has a 150.00 pack-year smoking history. He has never used smokeless tobacco. He reports previous alcohol use. He reports that he does not use drugs. He He indicated that his mother is . He indicated that his father is . He indicated that his sister is . He indicated that his brother is . His family history includes Cancer in his brother; Heart Disease in his father, mother, and sister. Review of Systems:       As above in HPI, otherwise negative, and/or noncontributory. Physical Examination:     /64   Pulse 71   Temp 97.4 °F (36.3 °C) (Oral)   Resp 18   Ht 5' 11\" (1.803 m)   Wt 186 lb 1.6 oz (84.4 kg)   SpO2 94%   BMI 25.96 kg/m²     General: Awake, alert. Oriented. Cooperative. Well developed and well nourished. Head: Normocephalic. Atraumatic. Eyes: PERRL. No erythema. No scleral icterus. Neck: Supple. Atraumatic. Trachea midline. Respiratory: Lung sounds diminished throughout with crackles to bilateral posterior lower lobes. Regular rate. No distress noted with respirations. Cardiovascular: S1S2. Irregularly irregular. + Systolic murmur noted. No edema. Carotids 2+ without bruit bilaterally. PPP. Gastrointestinal: Bowel sounds active. Non-tender, non-distended. Soft. Musculoskeletal: Strength equal bilaterally. Normal tone. Normal ROM throughout. Dermatological: Skin intact. No rash. No lesions. No jaundice. Hematological: No bruising or bleeding. Neurological: Oriented to person, place, and time. Cranial nerves II-XII grossly intact.      Diagnostic Testing: regurgitation with mild to moderate pulmonary hypertension, RVSP 39 mmHg.   10/19 carotids   Right carotid: Mild stenosis (<50%) with patent vertebral artery    Left carotid:             Mild stenosis (<50%) with patent vertebral artery        Patient case will be discussed with Dr. Luis Gilliam and any follow-up will be done accordingly by them. Thank you for the kind referral. If anything is needed in regards to patient care, please do not hesitate to call for further assistance. Trip Atkins 54, APRN-CNP, MSN      Office:  [phone]       ____________________________________________  [facility]   [address]  Tel:  [phone]      Fax:  [phone]  www. [facility]. [facility]

## 2020-02-29 NOTE — PLAN OF CARE
Problem: Falls - Risk of:  Pt fall risk, falling star, safety alarm activated and in use as needed. Hourly rounding performed. Pt encouraged to use call light. See Alissa Matias fall risk assessment. Goal: Will remain free from falls  Description  Will remain free from falls  2/29/2020 0015 by Celeste Nguyen RN  Outcome: Ongoing     Problem: Risk for Impaired Skin Integrity  Turn and reposition in bed, pressure reducing mattress, monitoring skin with every assessment and prn. Continue to monitor skin cancer sores on right upper extremity and right abdomen. Goal: Tissue integrity - skin and mucous membranes  Description  Structural intactness and normal physiological function of skin and  mucous membranes. 2/29/2020 0015 by Celeste Nguyen RN  Outcome: Ongoing     Problem: OXYGENATION/RESPIRATORY FUNCTION  Patients respiratory status stable at this time. No signs or symptoms of distress noted. Respirations non-labored and regular. Nasal canula 02 in place as needed to maintain sp02>90% or per md order. Continuous SpO2 monitoring in place. Goal: Patient will maintain patent airway  Outcome: Ongoing     Problem: ACTIVITY INTOLERANCE/IMPAIRED MOBILITY  Patient able to get up out of bed as tolerated with the use of a walker and gait belt. Patient has urgency related to getting lasix while in hospital and prefers using urinal in bed. Resident has colostomy for BM.    Goal: Mobility/activity is maintained at optimum level for patient  Outcome: Ongoing

## 2020-03-01 PROBLEM — J96.01 ACUTE RESPIRATORY FAILURE WITH HYPOXIA (HCC): Status: ACTIVE | Noted: 2020-01-01

## 2020-03-01 PROBLEM — J96.00 ACUTE RESPIRATORY FAILURE REQUIRING REINTUBATION (HCC): Status: ACTIVE | Noted: 2020-01-01

## 2020-03-01 NOTE — PROGRESS NOTES
[facility]    Progress Note    3/1/2020    7:17 AM    Name:   Lois Sims  MRN:     [id number]     Acct:      [de-identified]   Room:   [address] [place] [address] Day:  2  Admit Date:  2/28/2020  9:21 AM    PCP:   Kirk Treviño MD  Code Status:  DNR-CC    Subjective:     C/C:   Chief Complaint   Patient presents with    Shortness of Breath     Interval History Status: improved. The patient states he feels a little better but he still weak and short of breath with any activity. Does not appear as winded during a conversation. He has a good appetite. He states that this morning he has had a dry harsh cough    Brief History:     Julieth Lantigua is a 80 y.o. Non-/non  male who presents with Shortness of Breath   and is admitted to the hospital for the management of CHF (congestive heart failure), NYHA class III, acute, combined ([facility] [address]   The patient presents via EMS with complaints of shortness of breath and cough that have progressively worsened over the last 2 to 3 months.  The patient and his friend, at bedside, state he has a history of CHF and COPD.  He reports using his nebulizer twice this morning without much relief and he received a DuoNeb via EMS which did provide some relief.  He denies chest pain, nausea, vomiting, fever or chills.  He does state that related to fatigue and weakness is been difficult for him to ambulate over the last few days.  His history includes chronic diastolic and systolic CHF, COPD, stents, chronic A. fib, a pacemaker, colon cancer/colostomy and ex-smoker.  During my assessment the patient is clearly in some respiratory distress.  He is able to carry on a conversation but he does a occasionally pursed lip breathing or gasp related to air hunger.  At rest he does improve.  According to the patient's notes he has refused anticoagulation related to his A. fib related to bleeding around his colostomy and other --   --   --  76  --   --   --   --    VLDL  --   --   --  NOT REPORTED  --   --   --   --    POCGLU  --   --  153*  --  213* 121* 165* 154*     ABG:No results found for: POCPH, PHART, PH, POCPCO2, GBG8FDA, PCO2, POCPO2, PO2ART, PO2, POCHCO3, ITU6NKH, HCO3, NBEA, PBEA, BEART, BE, THGBART, THB, BJO7LJW, NEWF0SMX, Y8AUBJBK, O2SAT, FIO2  Lab Results   Component Value Date/Time    SPECIAL NOT REPORTED 02/28/2020 09:42 AM     Lab Results   Component Value Date/Time    CULTURE NO GROWTH 11/17/2019 07:47 PM       Radiology:  Ct Chest Wo Contrast    Result Date: 2/28/2020  Small pleural effusions. No consolidation. Xr Chest Portable    Result Date: 2/29/2020  CHF findings, grossly similar to the prior study from February 28, 2020. Xr Chest Portable    Result Date: 2/28/2020  Findings of volume overload with effusions and bibasilar airspace disease.        Physical Examination:     Physical Exam    Assessment:     Hospital Problems           Last Modified POA    * (Principal) CHF (congestive heart failure), NYHA class III, acute, combined ([address] 2/28/2020 Yes    Coronary artery disease involving native coronary artery of native heart without angina pectoris (Chronic) 2/28/2020 Yes    Hyperlipidemia (Chronic) 2/28/2020 Yes    Nonrheumatic aortic valve insufficiency (Chronic) 2/28/2020 Yes    Essential hypertension (Chronic) 2/28/2020 Yes    Chronic atrial fibrillation (Chronic) 2/28/2020 Yes    Pacemaker (Chronic) 2/28/2020 Yes    Pulmonary HTN ([address] (Chronic) 2/28/2020 Yes    Acquired hypothyroidism (Chronic) 2/28/2020 Yes    Gastroesophageal reflux disease without esophagitis (Chronic) 2/28/2020 Yes    Obstructive sleep apnea syndrome (Chronic) 2/28/2020 Yes    Overview Signed 8/26/2019 11:59 AM by ROBERT Tanner - NP     Intolerant of CPAP- has not worn since 2018         Chronic kidney disease 2/28/2020 Yes    COPD exacerbation ([address] 2/28/2020 Yes    Other emphysema ([address] (Chronic) 2/28/2020 Yes          Plan: 1. Continue Lasix 40 mg IV daily  2. Mucinex  3. Tessalon Perles  4. Titrate oxygen as he tolerates  5. Prednisone 20 mg p.o. twice daily  6. Heparin for DVT prophylaxis  7. Continue to follow BMP and BNP  8. Encourage cough and deep breathing  9. Low-sodium diet  10. Home medications reviewed and restarted as appropriate  11. PT/OT to evaluate and treat  12. Monitor I&O and daily weight related to CHF  13. Home O2 evaluation prior to discharge not needed if being discharged to [facility] DNR CC   15. I spoke with cardiology and they believe a repeat echo is not necessary at this time  16.  Discharge planning to [facility] early in the week for rehab    ROBERT Garcia CNP  3/1/2020  7:17 AM

## 2020-03-01 NOTE — PLAN OF CARE
Problem: Falls - Risk of:  Pt fall risk, falling star, safety alarm activated and in use as needed. Hourly rounding performed. Pt encouraged to use call light. See Sim Tomas fall risk assessment. Patient using call light when needing to use urinal or colostomy changed. Goal: Will remain free from falls  Description  Will remain free from falls  Outcome: Ongoing     Problem: Risk for Impaired Skin Integrity  Pt coccyx is pink. Encouraged the patient to reposition every two hours from side to side. Patient agrees and stated he likes having the pillow behind his back for support and his buttocks is feeling better already. Goal: Tissue integrity - skin and mucous membranes  Description  Structural intactness and normal physiological function of skin and  mucous membranes. Outcome: Ongoing     Problem: OXYGENATION/RESPIRATORY FUNCTION  Patients respiratory status stable at this time. No signs or symptoms of distress noted. Respirations non-labored and regular. Nasal canula 02 in place as needed to maintain sp02>90% or per md order. Goal: Patient will maintain patent airway  Outcome: Ongoing     Problem: Skin Integrity:  Monitor patient multiple skin cancer areas. Patient stated he has had these for years and has had many of them removed. Pt has an additional surgery scheduled for March 12.    Goal: Risk for impaired skin integrity will decrease  Description  Risk for impaired skin integrity will decrease  Outcome: Ongoing

## 2020-03-01 NOTE — PLAN OF CARE
Nursing brought to my attention that the patient is complaining of burning with urination.   Will order a UA with reflex to culture

## 2020-03-01 NOTE — PLAN OF CARE
Problem: Falls - Risk of:  Goal: Will remain free from falls  Description  Will remain free from falls  3/1/2020 4236 by Mayi Bellamy RN  Outcome: Ongoing     Problem: Risk for Impaired Skin Integrity  Goal: Tissue integrity - skin and mucous membranes  Description  Structural intactness and normal physiological function of skin and  mucous membranes. 3/1/2020 3262 by Mayi Bellamy RN  Outcome: Ongoing     Problem: OXYGENATION/RESPIRATORY FUNCTION  Goal: Patient will maintain patent airway  3/1/2020 8082 by Mayi Bellamy RN  Outcome: Ongoing     Problem: ACTIVITY INTOLERANCE/IMPAIRED MOBILITY  Goal: Mobility/activity is maintained at optimum level for patient  Outcome: Ongoing     Problem:  Activity:  Goal: Risk for activity intolerance will decrease  Description  Risk for activity intolerance will decrease  Outcome: Ongoing     Problem: Coping:  Goal: Ability to adjust to condition or change in health will improve  Description  Ability to adjust to condition or change in health will improve  Outcome: Ongoing     Problem: Fluid Volume:  Goal: Ability to maintain a balanced intake and output will improve  Description  Ability to maintain a balanced intake and output will improve  Outcome: Ongoing     Problem: Health Behavior:  Goal: Ability to identify and utilize available resources and services will improve  Description  Ability to identify and utilize available resources and services will improve  Outcome: Ongoing     Problem: Metabolic:  Goal: Ability to maintain appropriate glucose levels will improve  Description  Ability to maintain appropriate glucose levels will improve  Outcome: Ongoing     Problem: Nutritional:  Goal: Maintenance of adequate nutrition will improve  Description  Maintenance of adequate nutrition will improve  Outcome: Ongoing     Problem: Nutritional:  Goal: Progress toward achieving an optimal weight will improve  Description  Progress toward achieving an optimal weight will

## 2020-03-02 PROBLEM — J96.01 ACUTE RESPIRATORY FAILURE WITH HYPOXIA (HCC): Status: RESOLVED | Noted: 2020-01-01 | Resolved: 2020-01-01

## 2020-03-02 NOTE — PROGRESS NOTES
bleeding.     We will continue Lasix 40 mg IV twice daily, Solu-Medrol 40 mg every 6 hours, albuterol every 4 hours while awake.  Oxygen as needed.  Add meter glucose before meals and at bedtime related to steroid use.  Trend troponin and recheck BNP every other day     2/29 vital signs stable, proBNP has increased to 25,687.  Troponins trending down.  ISS for glucose management while on Solu-Medrol. Waiting for cardiology imput.     3/1 Lasix changed from 40 mg IV daily to one-time IV daily. Discontinue Solu-Medrol IV and start on prednisone p.o. Mucinex for secretions and salon Perles for cough    3/2 Lasix 40 mg p.o. daily, Aldactone 12.5 mg.  Plan to [facility] to [facility] later today. Review of Systems:     Review of Systems   Constitutional: Negative for chills, diaphoresis and fever. HENT: Negative for congestion. Eyes: Negative for visual disturbance. Respiratory: Positive for shortness of breath (With activity but it is better). Negative for cough, chest tightness and wheezing. Cardiovascular: Negative for chest pain, palpitations and leg swelling. Gastrointestinal: Negative for abdominal pain, blood in stool, constipation, diarrhea, nausea and vomiting. Genitourinary: Negative for difficulty urinating. Neurological: Positive for weakness. Negative for dizziness, light-headedness, numbness and headaches. All other systems reviewed and are negative. Medications: Allergies:     Allergies   Allergen Reactions    Eliquis [Apixaban] Other (See Comments)     feels funny     Pcn [Penicillins]     Pollen Extract     Rivaroxaban Other (See Comments)       Current Meds:   Scheduled Meds:    furosemide  40 mg Intravenous Daily    predniSONE  20 mg Oral BID    guaiFENesin  600 mg Oral BID    insulin lispro  0-6 Units Subcutaneous TID     insulin lispro  0-3 Units Subcutaneous Nightly    ipratropium-albuterol  1 ampule Inhalation Once    clopidogrel  75 mg Oral Daily    pantoprazole  40 mg Oral QAM AC    PARoxetine  10 mg Oral Daily    atorvastatin  20 mg Oral Daily    sodium chloride flush  10 mL Intravenous 2 times per day    heparin (porcine)  5,000 Units Subcutaneous 3 times per day    tiotropium  2 puff Inhalation Daily    budesonide-formoterol  2 puff Inhalation BID    carvedilol  3.125 mg Oral BID    levothyroxine  25 mcg Oral Daily    albuterol  2.5 mg Nebulization Q4H WA     Continuous Infusions:    dextrose       PRN Meds: benzonatate, glucose, dextrose, glucagon (rDNA), dextrose, acetaminophen, sodium chloride flush, polyethylene glycol, promethazine **OR** ondansetron, magnesium sulfate, potassium chloride **OR** potassium alternative oral replacement **OR** potassium chloride, albuterol    Data:     Past Medical History:   has a past medical history of Arthritis, Atrial fibrillation ([facility] CAD (coronary artery disease), CHF (congestive heart failure) ([facility] Colon cancer ([facility] GERD (gastroesophageal reflux disease), Heart attack ([facility] History of blood transfusion, History of ulcer disease, Hypercholesterolemia, Hypersomnia with sleep apnea, unspecified, Hypertension, Skin cancer, and Urinary frequency. Social History:   reports that he has quit smoking. He has a 150.00 pack-year smoking history. He has never used smokeless tobacco. He reports previous alcohol use. He reports that he does not use drugs.      Family History:   Family History   Problem Relation Age of Onset    Heart Disease Mother     Heart Disease Father     Cancer Brother     Heart Disease Sister        Vitals:  /73   Pulse 70   Temp 97.4 °F (36.3 °C) (Axillary)   Resp 16   Ht 5' 11\" (1.803 m)   Wt 176 lb (79.8 kg)   SpO2 95%   BMI 24.55 kg/m²   Temp (24hrs), Av.7 °F (36.5 °C), Min:97.4 °F (36.3 °C), Max:98.1 °F (36.7 °C)    Recent Labs     20  0723 20  1146 20  1645 20   POCGLU 124* 142* 137* 117*       I/O (24Hr): Intake/Output Summary (Last 24 hours) at 3/2/2020 0706  Last data filed at 3/2/2020 0230  Gross per 24 hour   Intake 540 ml   Output 1000 ml   Net -460 ml       Labs:  Hematology:  Recent Labs     02/28/20  0935 02/28/20  1021 02/29/20  0433   WBC 5.8  --  5.3   RBC 3.98*  --  3.85*   HGB 12.6*  --  12.3*   HCT 37.7*  --  36.1*   MCV 94.6  --  93.8   MCH 31.6  --  31.9   MCHC 33.4  --  34.1   RDW 14.6  --  14.4     --  158   MPV 8.5  --  10.2   INR  --  1.1  --    DDIMER  --  0.92  --      Chemistry:  Recent Labs     02/28/20  1021  02/29/20  0433  02/29/20  1625 02/29/20  2242 03/01/20  0426 03/02/20  0605     --  138  --   --   --  141  --    K 3.6*  --  3.8  --   --   --  3.9  --      --  103  --   --   --  103  --    CO2 20  --  19*  --   --   --  21  --    GLUCOSE 108*  --  163*  --   --   --  143*  --    BUN 36*  --  38*  --   --   --  48*  --    CREATININE 1.42*  --  1.32*  --   --   --  1.38*  --    MG 1.7  --  1.8  --   --   --   --   --    ANIONGAP 15  --  16  --   --   --  17  --    LABGLOM 47*  --  51*  --   --   --  48*  --    GFRAA 56*  --  >60  --   --   --  58*  --    CALCIUM 8.9  --  8.9  --   --   --  9.0  --    PROBNP 15,690*  --  25,687*  --   --   --   --  12,290*   TROPHS 88*   < > 67*   < > 73* 70* 69*  --    CKTOTAL 40  --   --   --   --   --   --   --    CKMB 2.8  --   --   --   --   --   --   --    MYOGLOBIN 82*  --  101*   < > 347* 297* 375*  --     < > = values in this interval not displayed.      Recent Labs     02/28/20  1021 02/28/20 2025 02/29/20  0433  02/29/20 1648 02/29/20  2208 03/01/20  0723 03/01/20  1146 03/01/20 1645 03/01/20 2058   PROT 6.2*  --   --  6.1*  --   --   --   --   --   --   --    LABALBU 3.7  --   --  3.5  --   --   --   --   --   --   --    TSH  --  1.46  --   --   --   --   --   --   --   --   --    AST 18  --   --  20  --   --   --   --   --   --   --    ALT 12  --   --  11  --   --   --   --   --   --   --    ALKPHOS 66  -- --  62  --   --   --   --   --   --   --    BILITOT 1.34*  --   --  1.09  --   --   --   --   --   --   --    BILIDIR 0.50*  --   --  0.39*  --   --   --   --   --   --   --    CHOL  --   --   --  89  --   --   --   --   --   --   --    HDL  --   --   --  31*  --   --   --   --   --   --   --    LDLCHOLESTEROL  --   --   --  43  --   --   --   --   --   --   --    CHOLHDLRATIO  --   --   --  2.9  --   --   --   --   --   --   --    TRIG  --   --   --  76  --   --   --   --   --   --   --    VLDL  --   --   --  NOT REPORTED  --   --   --   --   --   --   --    POCGLU  --   --    < >  --    < > 165* 154* 124* 142* 137* 117*    < > = values in this interval not displayed. ABG:No results found for: POCPH, PHART, PH, POCPCO2, XWE0JGN, PCO2, POCPO2, PO2ART, PO2, POCHCO3, HZF1NTA, HCO3, NBEA, PBEA, BEART, BE, THGBART, THB, WVT9PVV, SSTM1QNU, F5QNNKNU, O2SAT, FIO2  Lab Results   Component Value Date/Time    SPECIAL NOT REPORTED 02/28/2020 09:42 AM     Lab Results   Component Value Date/Time    CULTURE NO GROWTH 11/17/2019 07:47 PM       Radiology:  Ct Chest Wo Contrast    Result Date: 2/28/2020  Small pleural effusions. No consolidation. Xr Chest Portable    Result Date: 3/1/2020  No significant change in chest findings. Xr Chest Portable    Result Date: 2/29/2020  CHF findings, grossly similar to the prior study from February 28, 2020. Xr Chest Portable    Result Date: 2/28/2020  Findings of volume overload with effusions and bibasilar airspace disease. Physical Examination:     Physical Exam  Vitals signs and nursing note reviewed. Cardiovascular:      Rate and Rhythm: Normal rate and regular rhythm. Pulses: Normal pulses. Heart sounds: Normal heart sounds. Pulmonary:      Effort: Pulmonary effort is normal.      Breath sounds: Examination of the left-middle field reveals wheezing. Examination of the left-lower field reveals wheezing. Wheezing present.    Abdominal:      General: Bowel sounds are normal.   Musculoskeletal:         General: No swelling. Skin:     Capillary Refill: Capillary refill takes less than 2 seconds. Neurological:      Mental Status: He is oriented to person, place, and time. Assessment:     Hospital Problems           Last Modified POA    * (Principal) CHF (congestive heart failure), NYHA class III, acute, combined ([address] 2/28/2020 Yes    Coronary artery disease involving native coronary artery of native heart without angina pectoris (Chronic) 2/28/2020 Yes    Hyperlipidemia (Chronic) 2/28/2020 Yes    Nonrheumatic aortic valve insufficiency (Chronic) 2/28/2020 Yes    Essential hypertension (Chronic) 2/28/2020 Yes    Chronic atrial fibrillation (Chronic) 2/28/2020 Yes    Pacemaker (Chronic) 2/28/2020 Yes    Pulmonary HTN ([address] (Chronic) 2/28/2020 Yes    Acquired hypothyroidism (Chronic) 2/28/2020 Yes    Gastroesophageal reflux disease without esophagitis (Chronic) 2/28/2020 Yes    Obstructive sleep apnea syndrome (Chronic) 2/28/2020 Yes    Overview Signed 8/26/2019 11:59 AM by ROBERT Pinon NP     Intolerant of CPAP- has not worn since 2018         Chronic kidney disease 2/28/2020 Yes    COPD exacerbation ([address] 2/28/2020 Yes    Other emphysema ([address] (Chronic) 2/28/2020 Yes    Acute respiratory failure with hypoxia ([address] 3/1/2020 Yes          Plan:     1. Change Lasix IV to Lasix 40 mg p.o. daily  2. Resume Aldactone 12.5 mg daily  3. Continue PT/OT  4. Oxygen as needed  5. Continue Tessalon Perles as needed for cough  6. Mucinex twice a day for congestion  7. Continue low-sodium diet  8. Patient will need to follow-up with cardiology in 1 week  9.  Plan for discharge later today to [address] [place] [address], APRN - CNP  3/2/2020  7:06 AM

## 2020-03-02 NOTE — CARE COORDINATION
Transitional Planning;    CM notified by PT/OT about unable to get out of bed without assist. CM met with patient and discussed rehab. He would like to go to [facility] [place]> CM called admissions at [facility] of [address], s/w Bing and they do have bed openings for today. CM sent referral and awaiting if able ot accept. CM notified patient and he has good understanding. Red zone sheet for CHF given and explained. 1120: Phone call received from Bing at [facility]. Able to accept patient today. CM will follow up on time for DC.     1220: [id number] can accommodate  time of 1600 today. Notified RN, patient and admissions at [facility]. CM notified NP of DC time and plan.
approval (she is out of town on cruise).            Electronically signed by Sobia Judd RN, BSN on 2/28/20 at 3:40 PM

## 2020-03-02 NOTE — DISCHARGE SUMMARY
[facility]    Discharge Summary     Patient ID: Jaja Smith  :  1927   MRN: [id number]     ACCOUNT:  [de-identified]   Patient's PCP: Alysia Navarro MD  Admit Date: 2020   Discharge Date: 3/2/2020     Length of Stay: 3  Code Status:  DNR-CC  Admitting Physician: Layo Rios, DO  Discharge Physician: ROBERT Chapman CNP     Active Discharge Diagnoses:     Hospital Problem Lists:  Principal Problem:    CHF (congestive heart failure), NYHA class III, acute, combined ([address]  Active Problems:    Coronary artery disease involving native coronary artery of native heart without angina pectoris    Hyperlipidemia    Nonrheumatic aortic valve insufficiency    Essential hypertension    Chronic atrial fibrillation    Pacemaker    Pulmonary HTN ([address]    Acquired hypothyroidism    Gastroesophageal reflux disease without esophagitis    Obstructive sleep apnea syndrome    Chronic kidney disease    COPD exacerbation ([address]    Other emphysema ([address]  Resolved Problems:    Acute respiratory failure with hypoxia ([address]      Admission Condition:  fair     Discharged Condition: stable    Hospital Stay:     Hospital Course:Ganga Lantigua is a 80 y.o. Non-/non  male who presents with Shortness of Breath   and is admitted to the hospital for the management of CHF (congestive heart failure), NYHA class III, acute, combined ([address]   The patient presents via EMS with complaints of shortness of breath and cough that have progressively worsened over the last 2 to 3 months.  The patient and his friend, at bedside, state he has a history of CHF and COPD.  He reports using his nebulizer twice this morning without much relief and he received a DuoNeb via EMS which did provide some relief.  He denies chest pain, nausea, vomiting, fever or chills.  He does state that related to fatigue and weakness is been difficult for him to ambulate over the last few days.  His history includes chronic diastolic and systolic CHF, COPD, stents, chronic A. fib, a pacemaker, colon cancer/colostomy and ex-smoker.  During my assessment the patient is clearly in some respiratory distress.  He is able to carry on a conversation but he does a occasionally pursed lip breathing or gasp related to air hunger.  At rest he does improve.  According to the patient's notes he has refused anticoagulation related to his A. fib related to bleeding around his colostomy and other risk of bleeding.     We will continue Lasix 40 mg IV twice daily, Solu-Medrol 40 mg every 6 hours, albuterol every 4 hours while awake.  Oxygen as needed.  Add meter glucose before meals and at bedtime related to steroid use.  Trend troponin and recheck BNP every other day     2/29 vital signs stable, proBNP has increased to 25,687.  Troponins trending down.  ISS for glucose management while on Solu-Medrol. Waiting for cardiology imput.     3/1 Lasix changed from 40 mg IV daily to one-time IV daily. Discontinue Solu-Medrol IV and start on prednisone p.o. Mucinex for secretions and salon Perles for cough    3/2 Lasix 40 mg p.o. daily, Aldactone 12.5 mg.  Plan to discharge to [facility] later today. Significant therapeutic interventions: Lasix IV and Solu-Medrol IV.   Mucinex for congestion and Tessalon Perles for cough    Significant Diagnostic Studies:   Labs / Micro:  CBC:   Lab Results   Component Value Date    WBC 10.0 03/02/2020    RBC 4.18 03/02/2020    HGB 13.1 03/02/2020    HCT 39.4 03/02/2020    MCV 94.3 03/02/2020    MCH 31.3 03/02/2020    MCHC 33.2 03/02/2020    RDW 14.1 03/02/2020     03/02/2020     BMP:    Lab Results   Component Value Date    GLUCOSE 124 03/02/2020     03/02/2020    K 3.8 03/02/2020     03/02/2020    CO2 24 03/02/2020    ANIONGAP 13 03/02/2020    BUN 53 03/02/2020    CREATININE 1.42 03/02/2020    BUNCRER NOT REPORTED 03/02/2020    CALCIUM 8.8 03/02/2020 LABGLOM 47 03/02/2020    GFRAA 56 03/02/2020    GFR      03/02/2020    GFR NOT REPORTED 03/02/2020     HFP:    Lab Results   Component Value Date    PROT 6.1 02/29/2020     CMP:    Lab Results   Component Value Date    GLUCOSE 124 03/02/2020     03/02/2020    K 3.8 03/02/2020     03/02/2020    CO2 24 03/02/2020    BUN 53 03/02/2020    CREATININE 1.42 03/02/2020    ANIONGAP 13 03/02/2020    ALKPHOS 62 02/29/2020    ALT 11 02/29/2020    AST 20 02/29/2020    BILITOT 1.09 02/29/2020    LABALBU 3.5 02/29/2020    ALBUMIN 1.3 02/29/2020    LABGLOM 47 03/02/2020    GFRAA 56 03/02/2020    GFR      03/02/2020    GFR NOT REPORTED 03/02/2020    PROT 6.1 02/29/2020    CALCIUM 8.8 03/02/2020     PT/INR:    Lab Results   Component Value Date    PROTIME 11.1 02/28/2020    INR 1.1 02/28/2020     U/A:    Lab Results   Component Value Date    COLORU YELLOW 03/01/2020    TURBIDITY CLEAR 03/01/2020    SPECGRAV 1.020 03/01/2020    HGBUR NEGATIVE 03/01/2020    PHUR 5.0 03/01/2020    PROTEINU NEGATIVE 03/01/2020    GLUCOSEU NEGATIVE 03/01/2020    KETUA NEGATIVE 03/01/2020    BILIRUBINUR NEGATIVE 03/01/2020    UROBILINOGEN Normal 03/01/2020    NITRU NEGATIVE 03/01/2020    LEUKOCYTESUR NEGATIVE 03/01/2020     TSH:    Lab Results   Component Value Date    TSH 1.46 02/28/2020        Radiology:  Ct Chest Wo Contrast    Result Date: 2/28/2020  Small pleural effusions. No consolidation. Xr Chest Portable    Result Date: 3/2/2020  Mild decreased vascular congestion and mild improved aeration at the right lung base, otherwise similar appearing chest with small bilateral pleural effusions and left basilar airspace disease. Xr Chest Portable    Result Date: 3/1/2020  No significant change in chest findings. Xr Chest Portable    Result Date: 2/29/2020  CHF findings, grossly similar to the prior study from February 28, 2020.      Xr Chest Portable    Result Date: 2/28/2020  Findings of volume overload with effusions and

## 2020-03-02 NOTE — DISCHARGE INSTR - COC
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    Pulmonary HTN ([facility]) I27.20    Bilateral carotid artery stenosis I65.23    Acquired hypothyroidism E03.9    Coronary atherosclerosis I25.10    Daytime somnolence R40.0    Dependence on enabling 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    Depressive disorder F32.9    Fatigue R53.83    Gastroesophageal reflux disease without esophagitis K21.9    Hypothyroidism E03.9    Primary localized osteoarthritis of pelvic region and thigh M16.10    Malignant tumor of rectum ([facility] C20    Obstructive sleep apnea syndrome G47.33    Bacterial UTI N39.0, A49.9    Parastomal hernia without obstruction or gangrene K43.5    Pressure ulcer L89.90    RAGHAV (acute kidney injury) ([facility] N17.9    Bacteremia:  Klebsiella aerogenes R78.81    Urinary tract infection without hematuria N39.0    Elevated PSA R97.20    Pneumonia due to organism J18.9    Community acquired pneumonia of left lower lobe of lung ([facility]) J18.1    Chronic combined systolic and diastolic heart failure ([facility]) I50.42    UTI due to Klebsiella species N39.0, B96.1    MRSA infection:  UTI A49.02    CHF (congestive heart failure), NYHA class III, acute, combined ([facility]) I50.41    Chronic kidney disease N18.9    COPD exacerbation ([facility]) J44.1    Other emphysema ([facility]) J43.8    Acute respiratory failure with hypoxia ([facility]) J96.01       Isolation/Infection:   Isolation          Contact        Patient Infection Status     Infection Onset Added Last Indicated Last Indicated By Review

## 2020-03-02 NOTE — PLAN OF CARE
Pt taking aerosol treatments and MDI as ordered. BS diminished. SpO2 95% on room air. Will continue to monitor.

## 2020-03-02 NOTE — PROGRESS NOTES
Nutrition Education    Type and Reason for Visit: Patient Education    Nutrition Assessment:  Pt states he is non-compliant with his sodium restriction. Pt states due to his age, he feels as if he should be able to eat anything he wants. Discussed rationale for sodium restricted diet and pt states \"I've heard that before\"  Compliancy encouraged, however anticipate continued non-compliance.      · Verbally reviewed information with patient      Electronically signed by Felipa Nation RD, LD on 3/2/20 at 11:44 AM    Felipa Nation RD,HEIDY  Clinical Dietitian  [facility]  [phone]

## 2020-03-02 NOTE — PROGRESS NOTES
Occupational Therapy  Facility/Department: [facility]  Daily Treatment Note  NAME: Corey Rangel  : 1927  MRN: [id number]    Date of Service: 3/2/2020    Discharge Recommendations:  Patient would benefit from continued therapy after discharge   Pt unsafe to return to prior home setup and will require continued therapy intervention to maximize pt's safety and independence in performing functional tasks. Assessment   Performance deficits / Impairments: Decreased functional mobility ; Decreased ADL status; Decreased endurance;Decreased strength;Decreased safe awareness;Decreased balance;Decreased coordination  Prognosis: Good  Decision Making: Medium Complexity  Patient Education: OT Services/OT POC, Safety Awareness/Fall Prevention, Safety with Transfers/RW Mngt, ADL Techniques, Pursed Lip Breathing Techniques; good return  REQUIRES OT FOLLOW UP: Yes  Activity Tolerance  Activity Tolerance: Patient limited by fatigue(and SOB)  Safety Devices  Safety Devices in place: Yes  Type of devices: Call light within reach;Nurse notified; Left in chair;Chair alarm in place  Restraints  Initially in place: No         Patient Diagnosis(es): The primary encounter diagnosis was Acute on chronic combined systolic and diastolic congestive heart failure ([address] A diagnosis of COPD exacerbation ([address] was also pertinent to this visit. has a past medical history of Arthritis, Atrial fibrillation ([address] CAD (coronary artery disease), CHF (congestive heart failure) ([address] Colon cancer ([address] GERD (gastroesophageal reflux disease), Heart attack ([address] History of blood transfusion, History of ulcer disease, Hypercholesterolemia, Hypersomnia with sleep apnea, unspecified, Hypertension, Skin cancer, and Urinary frequency. has a past surgical history that includes colectomy (); Skin cancer excision; Coronary angioplasty with stent (2013, ?); eye surgery (Bilateral);  Total knee arthroplasty Minimal assistance    Bed mobility  Scooting: Stand by assistance(in chair)  Comment: Pt up to chair at therapist arrival and retired up to chair at therapist exit. Transfers  Sit to stand: Minimal assistance  Stand to sit: Minimal assistance  Transfer Comments: Pt impulsive and required mod VCs for sequencing/safety awareness throughout functional transfers. Cognition  Overall Cognitive Status: Exceptions  Attention Span: Attends with cues to redirect; Difficulty attending to directions  Safety Judgement: Decreased awareness of need for assistance  Problem Solving: Assistance required to generate solutions;Assistance required to implement solutions  Insights: Decreased awareness of deficits         Plan   Plan  Times per week: 5-6x/wk  Current Treatment Recommendations: Strengthening, Functional Mobility Training, Endurance Training, Balance Training, Safety Education & Training, Patient/Caregiver Education & Training, Equipment Evaluation, Education, & procurement, Home Management Training, Self-Care / ADL    AM-PAC Score  AM-[facility] Inpatient Daily Activity Raw Score: 16 (02/29/20 1535)  AM-PAC Inpatient ADL T-Scale Score : 35.96 (02/29/20 1535)  ADL Inpatient CMS 0-100% Score: 53.32 (02/29/20 1535)  ADL Inpatient CMS G-Code Modifier : CK (02/29/20 1535)    Goals  Short term goals  Time Frame for Short term goals: 14 visits  Short term goal 1: pt will independently demo good safety awareness during ADLs and functional transfers/functional mobility  Short term goal 2: pt will perform grooming/UB ADL tasks independently  Short term goal 3: pt will perform toileting/LB ADL tasks with min A using AE/DME PRN  Short term goal 4: pt will demo 10+ minutes standing tolerance with use of RW for increased participation in ADLs  Short term goal 5: pt will independently demo ability to incorporate energy conservation techniques as needed during ADLs and functional transfers/functional transfers  Short term goal 6: pt will perform functional transfers/functional mobility with mod IND using RW       Therapy Time   Individual Concurrent Group Co-treatment   Time In 1025         Time Out 1059         Minutes 34         Timed Code Treatment Minutes: [facility]       Jasper Galloway OTR/L

## 2020-03-02 NOTE — PLAN OF CARE
Problem: Falls - Risk of:  Goal: Will remain free from falls  Description  Will remain free from falls  Outcome: Ongoing     Problem: Risk for Impaired Skin Integrity  Goal: Tissue integrity - skin and mucous membranes  Description  Structural intactness and normal physiological function of skin and  mucous membranes.   Outcome: Ongoing     Problem: OXYGENATION/RESPIRATORY FUNCTION  Goal: Patient will maintain patent airway  Outcome: Ongoing     Problem: OXYGENATION/RESPIRATORY FUNCTION  Goal: Patient will achieve/maintain normal respiratory rate/effort  Description  Respiratory rate and effort will be within normal limits for the patient  Outcome: Ongoing     Problem: Metabolic:  Goal: Ability to maintain appropriate glucose levels will improve  Description  Ability to maintain appropriate glucose levels will improve  Outcome: Ongoing     Problem: Nutritional:  Goal: Maintenance of adequate nutrition will improve  Description  Maintenance of adequate nutrition will improve  Outcome: Ongoing     Problem: Skin Integrity:  Goal: Risk for impaired skin integrity will decrease  Description  Risk for impaired skin integrity will decrease  Outcome: Ongoing

## 2020-03-02 NOTE — PROGRESS NOTES
[address] [facility]  Cardiology Progress Note/Message    Spoke with nursing: no acute cardiac issues overnight. VSS. Breathing improved. Patient chart reviewed. BP and HR well controlled. Weight down from admission. I/O's negative, however, unsure of accuracy. From Cardiac Standpoint:  - No objection to changing diuretics to PO  - Increase activity and discharge planning  - No objection to discharge from cardiac standpoint  - Upon discharge, can f/u in our office in 1 week  - Please call if anything is needed in regards to patient care. Thank you. The above will be dicussed with Dr. Deanna Mueller. Any further follow-up will be accordingly performed by them.      Poli Hunt, APRN-CNP, MSN  [address] [facility]  [phone]

## 2020-03-02 NOTE — PROGRESS NOTES
Good;-  Sitting - Dynamic: Good;-  Standing - Static: Fair;+  Standing - Dynamic: Fair  Comments: standing balance assessed with RW          Exercises:    Seated LE exercise program: Long Arc Quads, hip abduction/adduction, heel/toe raises, and marches. Reps: 20   Pt requires constant verbal cues for breathing techniques as pt tends to breathe out of his mouth.              Goals  Short term goals  Time Frame for Short term goals: 14 visits  Short term goal 1: Pt to ambulate 300ft modified independently with RW to return to prior functional level  Short term goal 2: Pt to sit <> stand transfer independently to allow for return to prior functional level  Short term goal 3: Pt to demonstrate independent bed mobility to allow for return to prior functional level  Short term goal 4: Pt to tolerate 30 minutes of therapy for endurance  Short term goal 5: Pt to demonstrate good - standing balance to decrease risk of falls  Patient Goals   Patient goals : Pt would like to get moving    Plan    Plan  Times per week: 5-6x  Times per day: Daily  Current Treatment Recommendations: Strengthening, Transfer Training, Balance Training, Functional Mobility Training, Endurance Training, Gait Training, Home Exercise Program, Safety Education & Training, Patient/Caregiver Education & Training  Safety Devices  Type of devices: Call light within reach, Gait belt, Left in chair, Chair alarm in place, Nurse notified  Restraints  Initially in place: No     Therapy Time   Individual Concurrent Group Co-treatment   Time In 1137         Time Out 1208         Minutes 31         Timed Code Treatment Minutes: [address], PT

## 2020-03-25 PROBLEM — E03.9 HYPOTHYROIDISM: Status: RESOLVED | Noted: 2018-09-27 | Resolved: 2020-01-01
